# Patient Record
Sex: FEMALE | Race: WHITE | Employment: FULL TIME
[De-identification: names, ages, dates, MRNs, and addresses within clinical notes are randomized per-mention and may not be internally consistent; named-entity substitution may affect disease eponyms.]

---

## 2020-07-21 ENCOUNTER — HOSPITAL ENCOUNTER (OUTPATIENT)
Dept: ULTRASOUND IMAGING | Facility: CLINIC | Age: 35
Discharge: HOME | End: 2020-07-23
Payer: COMMERCIAL

## 2020-07-21 ENCOUNTER — HOSPITAL ENCOUNTER (OUTPATIENT)
Age: 35
Setting detail: SPECIMEN
Discharge: HOME | End: 2020-07-21
Payer: COMMERCIAL

## 2020-07-21 ENCOUNTER — OFFICE VISIT (OUTPATIENT)
Dept: OBGYN CLINIC | Age: 35
End: 2020-07-21
Payer: COMMERCIAL

## 2020-07-21 VITALS
DIASTOLIC BLOOD PRESSURE: 72 MMHG | SYSTOLIC BLOOD PRESSURE: 120 MMHG | WEIGHT: 163 LBS | HEIGHT: 63 IN | BODY MASS INDEX: 28.88 KG/M2

## 2020-07-21 PROCEDURE — 76856 US EXAM PELVIC COMPLETE: CPT

## 2020-07-21 PROCEDURE — 76830 TRANSVAGINAL US NON-OB: CPT

## 2020-07-21 PROCEDURE — 99385 PREV VISIT NEW AGE 18-39: CPT | Performed by: OBSTETRICS & GYNECOLOGY

## 2020-07-21 RX ORDER — IBUPROFEN 200 MG
200 TABLET ORAL EVERY 6 HOURS PRN
Status: ON HOLD | COMMUNITY
End: 2020-11-03 | Stop reason: HOSPADM

## 2020-07-21 NOTE — PROGRESS NOTES
DATE OF VISIT:  20        History and Physical    Rae Serrano    :  1985  CHIEF COMPLAINT:    Chief Complaint   Patient presents with   Aster Retana New Doctor     New patient Here for annual  Last pap 17nl   Has mirena Iud it was placed                     HPI :   Lara Acosta is a 30 y.o. femaleGRAVIDA 2 PARA     Lara Acosta comes to the office today as a new visit self-referral.  She is not's been seen by a GYN since . Her former GYN was Dr. Ankit Sebastian who apparently inserted a Mirena in . In  her IUD could not be found and a hysteroscopy was performed and no IUD was found inside the uterus. She apparently has been having no pelvic pain but does have chronic back pain and is followed by a chiropractor. She relatively recently had an abdominal x-ray which did show an IUD present. Review of the film is strongly suggestive that the IUD has migrated out and may be attached to cecum versus small bowel. She is having regular monthly menstrual cycles lasting 3 days with no significant discomfort. She denies painful intercourse, constipation/diarrhea or blood noted in her stool. She denies any UTI symptoms or involuntary loss of urine.   She is otherwise without any significant complaints today.  _____________________________________________________________________  Past Medical History:   Diagnosis Date    Abnormal Pap smear of cervix                                                                    Past Surgical History:   Procedure Laterality Date    LAPAROSCOPY      looked for iud    OTHER SURGICAL HISTORY  2013    cone biopsy      Family History   Problem Relation Age of Onset    Diabetes Maternal Grandfather     Hypertension Maternal Grandfather     Hypertension Father     Hypertension Mother      Social History     Tobacco Use   Smoking Status Current Every Day Smoker   Smokeless Tobacco Never Used     Social History     Substance and Sexual deficits. Skin:  A brief inspection of the skin revealed no rashes or lesions. Neck:  The neck was supple. There is no tracheal deviation, thyromegaly or supraclavicular adenopathy appreciated. Breast:   The patients breasts were symmetrical.  Breasts are nontender and there  were no masses, discharge or pathologic skin changes. There is no supraclavicular or axillary adenopathy bilaterally. Respiratory: There was unlabored respiratory effort. Lungs clear to ascultation without wheezes, rales or rhonchi in all fields bilaterally. Cardiovascular:  Normal sinus rhythm with a regular rate and without murmur, rubs or gallops. Abdomen: The abdomen was soft and non-tender with no guarding, rebound, CVAT or rigidity. No hernias were appreciated. Bowel sounds were normally active. Pelvic exam:  No vulvar, vaginal or cervical lesions are noted. Normal vaginal discharge present, no significant cystocele, rectocele or enterocele noted. Uterus nongravid and without CMT and adnexa nontender and without abnormal masses bilaterally. Extremities:  FROM and nontender without clubbing cyanosis or edema. ASSESSMENT:         ICD-10-CM    1. Encounter for gynecological examination without abnormal finding  Z01.419 PAP SMEAR   2. IUD migration, initial encounter ([facility].)  T83.89XA US Non OB Transvaginal     US Pelvis Complete                   PLAN:  Discussed x-ray findings with patient and her mother who was present for the entire visit. From the x-ray it appears that the IUD is out of the uterus and in the pelvis but will get pelvic ultrasound. Discussed that with the time the IUD has been out of the uterus and the likelihood of scarring to bowel. Also because she has been asymptomatic if IUD retrieval was done the possibility of bowel injury must be considered. She will be contacted with the ultrasound results and offered a general surgery consultation if she desires.     Return to the office in 1 year or when necessary  Patient was seen with total face to face time of 20 minutes. Salome Roberts M.D., Mph  [facility]  [place]

## 2020-07-21 NOTE — PATIENT INSTRUCTIONS
We will contact you with the results of today's Pap smear. We will also order a pelvic ultrasound, contact you with those results and recommendation for follow-up. Return to the office in 1 year or as needed. Have a safe summer and healthy year!

## 2020-07-23 LAB
HPV SAMPLE: NORMAL
HPV, GENOTYPE 16: NOT DETECTED
HPV, GENOTYPE 18: NOT DETECTED
HPV, HIGH RISK OTHER: NOT DETECTED
HPV, INTERPRETATION: NORMAL
SPECIMEN DESCRIPTION: NORMAL

## 2020-07-24 LAB — CYTOLOGY REPORT: NORMAL

## 2020-08-27 PROBLEM — F41.9 ANXIETY AND DEPRESSION: Status: ACTIVE | Noted: 2020-08-07

## 2020-08-27 PROBLEM — F32.A ANXIETY AND DEPRESSION: Status: ACTIVE | Noted: 2020-08-07

## 2020-08-27 PROBLEM — F31.9 BIPOLAR DISORDER (HCC): Status: ACTIVE | Noted: 2020-08-25

## 2020-08-27 PROBLEM — J45.30 MILD PERSISTENT ASTHMA WITHOUT COMPLICATION: Status: ACTIVE | Noted: 2020-08-07

## 2020-08-27 PROBLEM — Z72.0 TOBACCO ABUSE: Status: ACTIVE | Noted: 2020-08-07

## 2020-08-28 RX ORDER — ESCITALOPRAM OXALATE 20 MG/1
20 TABLET ORAL DAILY
Status: ON HOLD | COMMUNITY
End: 2020-11-03

## 2020-08-28 RX ORDER — ALBUTEROL SULFATE 90 UG/1
2 AEROSOL, METERED RESPIRATORY (INHALATION) EVERY 6 HOURS PRN
COMMUNITY

## 2020-08-28 NOTE — PROGRESS NOTES
Preoperative Instructions:    Stop eating solid foods at midnight the night prior to your surgery. Stop drinking clear liquids at midnight the night prior to your surgery. Arrive at the surgery center (3rd entrance) on _[phone]______________ by __1230 wearing a mask._____________. Please stop any blood thinning medications as directed by your surgeon or prescribing physician. Failure to stop certain medications may interfere with your scheduled surgery. These may include: Aspirin, Coumadin, Plavix, NSAIDS (Motrin, Aleve, Advil, Mobic, Celebrex), Eliquis, Pradaxa, Xarelto, Fish oil, and herbal supplements. Hold Ibuprofen    You may continue the rest of your medications through the night before surgery unless instructed otherwise. Day of surgery please take only the following medication(s) with a small sip of water: Lexapro      Please use and bring inhalers the day of surgery. Symbicort. (Use Albuterol if needed)      Reminders:  -If you are going home the day of your procedure, you will need a family member or friend to stay during the procedure and drive you home after your procedure. Your  must be 25years of age or older and able to sign off on your discharge instructions.    -If you are going home the same day of your surgery, someone must remain with you for the first 24 hours after your surgery if you receive sedation or anesthesia.      -Please do not wear any jewelery  Lotions, contacts or body piercing the day of surgery

## 2020-08-28 NOTE — PROGRESS NOTES
PAT interview completed. Pre anesthesia  Instructions given, Instructed pt not to smoke the morning of surgery. Asked pt to shower with [address] AND THE MORNING OF surgery.

## 2020-08-29 ENCOUNTER — HOSPITAL ENCOUNTER (OUTPATIENT)
Dept: PREADMISSION TESTING | Age: 35
Setting detail: SPECIMEN
Discharge: HOME | End: 2020-09-02
Payer: COMMERCIAL

## 2020-08-29 PROCEDURE — U0003 INFECTIOUS AGENT DETECTION BY NUCLEIC ACID (DNA OR RNA); SEVERE ACUTE RESPIRATORY SYNDROME CORONAVIRUS 2 (SARS-COV-2) (CORONAVIRUS DISEASE [COVID-19]), AMPLIFIED PROBE TECHNIQUE, MAKING USE OF HIGH THROUGHPUT TECHNOLOGIES AS DESCRIBED BY CMS-2020-01-R: HCPCS

## 2020-08-31 ENCOUNTER — ANESTHESIA EVENT (OUTPATIENT)
Dept: OPERATING ROOM | Age: 35
End: 2020-08-31
Payer: COMMERCIAL

## 2020-08-31 LAB — SARS-COV-2, NAA: NOT DETECTED

## 2020-09-01 ENCOUNTER — TELEPHONE (OUTPATIENT)
Dept: PRIMARY CARE CLINIC | Age: 35
End: 2020-09-01

## 2020-09-02 ENCOUNTER — ANESTHESIA (OUTPATIENT)
Dept: OPERATING ROOM | Age: 35
End: 2020-09-02
Payer: COMMERCIAL

## 2020-09-02 ENCOUNTER — HOSPITAL ENCOUNTER (OUTPATIENT)
Age: 35
Setting detail: OUTPATIENT SURGERY
Discharge: HOME | End: 2020-09-02
Attending: SURGERY | Admitting: SURGERY
Payer: COMMERCIAL

## 2020-09-02 ENCOUNTER — APPOINTMENT (OUTPATIENT)
Dept: GENERAL RADIOLOGY | Age: 35
End: 2020-09-02
Attending: SURGERY
Payer: COMMERCIAL

## 2020-09-02 VITALS
RESPIRATION RATE: 15 BRPM | TEMPERATURE: 98.7 F | OXYGEN SATURATION: 99 % | DIASTOLIC BLOOD PRESSURE: 84 MMHG | SYSTOLIC BLOOD PRESSURE: 125 MMHG | HEIGHT: 63 IN | HEART RATE: 75 BPM | WEIGHT: 160.2 LBS | BODY MASS INDEX: 28.39 KG/M2

## 2020-09-02 VITALS
OXYGEN SATURATION: 100 % | DIASTOLIC BLOOD PRESSURE: 84 MMHG | RESPIRATION RATE: 14 BRPM | TEMPERATURE: 96.6 F | SYSTOLIC BLOOD PRESSURE: 145 MMHG

## 2020-09-02 LAB
ABO/RH: NORMAL
ANTIBODY SCREEN: NEGATIVE
ARM BAND NUMBER: NORMAL
EXPIRATION DATE: NORMAL
HCG, PREGNANCY URINE (POC): NEGATIVE

## 2020-09-02 PROCEDURE — 2580000003 HC RX 258: Performed by: ANESTHESIOLOGY

## 2020-09-02 PROCEDURE — 3600000019 HC SURGERY ROBOT ADDTL 15MIN: Performed by: SURGERY

## 2020-09-02 PROCEDURE — 7100000000 HC PACU RECOVERY - FIRST 15 MIN: Performed by: SURGERY

## 2020-09-02 PROCEDURE — 3209999900 FLUORO FOR SURGICAL PROCEDURES

## 2020-09-02 PROCEDURE — 2709999900 HC NON-CHARGEABLE SUPPLY: Performed by: SURGERY

## 2020-09-02 PROCEDURE — 6360000002 HC RX W HCPCS: Performed by: NURSE ANESTHETIST, CERTIFIED REGISTERED

## 2020-09-02 PROCEDURE — 74018 RADEX ABDOMEN 1 VIEW: CPT

## 2020-09-02 PROCEDURE — 64488 TAP BLOCK BI INJECTION: CPT | Performed by: NURSE ANESTHETIST, CERTIFIED REGISTERED

## 2020-09-02 PROCEDURE — C9290 INJ, BUPIVACAINE LIPOSOME: HCPCS | Performed by: ANESTHESIOLOGY

## 2020-09-02 PROCEDURE — 7100000001 HC PACU RECOVERY - ADDTL 15 MIN: Performed by: SURGERY

## 2020-09-02 PROCEDURE — 86850 RBC ANTIBODY SCREEN: CPT

## 2020-09-02 PROCEDURE — 2500000003 HC RX 250 WO HCPCS: Performed by: NURSE ANESTHETIST, CERTIFIED REGISTERED

## 2020-09-02 PROCEDURE — 3700000001 HC ADD 15 MINUTES (ANESTHESIA): Performed by: SURGERY

## 2020-09-02 PROCEDURE — 7100000011 HC PHASE II RECOVERY - ADDTL 15 MIN: Performed by: SURGERY

## 2020-09-02 PROCEDURE — 6360000002 HC RX W HCPCS: Performed by: SURGERY

## 2020-09-02 PROCEDURE — 3600000009 HC SURGERY ROBOT BASE: Performed by: SURGERY

## 2020-09-02 PROCEDURE — 7100000010 HC PHASE II RECOVERY - FIRST 15 MIN: Performed by: SURGERY

## 2020-09-02 PROCEDURE — 81025 URINE PREGNANCY TEST: CPT

## 2020-09-02 PROCEDURE — 6360000002 HC RX W HCPCS

## 2020-09-02 PROCEDURE — S2900 ROBOTIC SURGICAL SYSTEM: HCPCS | Performed by: SURGERY

## 2020-09-02 PROCEDURE — 6360000002 HC RX W HCPCS: Performed by: ANESTHESIOLOGY

## 2020-09-02 PROCEDURE — 3700000000 HC ANESTHESIA ATTENDED CARE: Performed by: SURGERY

## 2020-09-02 PROCEDURE — 86901 BLOOD TYPING SEROLOGIC RH(D): CPT

## 2020-09-02 PROCEDURE — 2500000003 HC RX 250 WO HCPCS: Performed by: ANESTHESIOLOGY

## 2020-09-02 PROCEDURE — 86900 BLOOD TYPING SEROLOGIC ABO: CPT

## 2020-09-02 RX ORDER — DEXAMETHASONE SODIUM PHOSPHATE 10 MG/ML
INJECTION, SOLUTION INTRAMUSCULAR; INTRAVENOUS PRN
Status: DISCONTINUED | OUTPATIENT
Start: 2020-09-02 | End: 2020-09-02 | Stop reason: SDUPTHER

## 2020-09-02 RX ORDER — ONDANSETRON 2 MG/ML
4 INJECTION INTRAMUSCULAR; INTRAVENOUS
Status: DISCONTINUED | OUTPATIENT
Start: 2020-09-02 | End: 2020-09-02 | Stop reason: HOSPADM

## 2020-09-02 RX ORDER — BUPIVACAINE HYDROCHLORIDE 7.5 MG/ML
INJECTION, SOLUTION INTRASPINAL
Status: DISCONTINUED
Start: 2020-09-02 | End: 2020-09-02 | Stop reason: HOSPADM

## 2020-09-02 RX ORDER — FENTANYL CITRATE 50 UG/ML
INJECTION, SOLUTION INTRAMUSCULAR; INTRAVENOUS
Status: COMPLETED
Start: 2020-09-02 | End: 2020-09-02

## 2020-09-02 RX ORDER — DIPHENHYDRAMINE HYDROCHLORIDE 50 MG/ML
12.5 INJECTION INTRAMUSCULAR; INTRAVENOUS
Status: DISCONTINUED | OUTPATIENT
Start: 2020-09-02 | End: 2020-09-02 | Stop reason: HOSPADM

## 2020-09-02 RX ORDER — 0.9 % SODIUM CHLORIDE 0.9 %
500 INTRAVENOUS SOLUTION INTRAVENOUS
Status: DISCONTINUED | OUTPATIENT
Start: 2020-09-02 | End: 2020-09-02 | Stop reason: HOSPADM

## 2020-09-02 RX ORDER — BUPIVACAINE HYDROCHLORIDE 2.5 MG/ML
INJECTION, SOLUTION EPIDURAL; INFILTRATION; INTRACAUDAL
Status: DISCONTINUED
Start: 2020-09-02 | End: 2020-09-02 | Stop reason: HOSPADM

## 2020-09-02 RX ORDER — OXYCODONE HYDROCHLORIDE AND ACETAMINOPHEN 5; 325 MG/1; MG/1
2 TABLET ORAL PRN
Status: DISCONTINUED | OUTPATIENT
Start: 2020-09-02 | End: 2020-09-02 | Stop reason: HOSPADM

## 2020-09-02 RX ORDER — LABETALOL 20 MG/4 ML (5 MG/ML) INTRAVENOUS SYRINGE
5 EVERY 10 MIN PRN
Status: DISCONTINUED | OUTPATIENT
Start: 2020-09-02 | End: 2020-09-02 | Stop reason: HOSPADM

## 2020-09-02 RX ORDER — SODIUM CHLORIDE 9 MG/ML
INJECTION INTRAVENOUS
Status: DISCONTINUED
Start: 2020-09-02 | End: 2020-09-02 | Stop reason: HOSPADM

## 2020-09-02 RX ORDER — NEOSTIGMINE METHYLSULFATE 5 MG/5 ML
SYRINGE (ML) INTRAVENOUS PRN
Status: DISCONTINUED | OUTPATIENT
Start: 2020-09-02 | End: 2020-09-02 | Stop reason: SDUPTHER

## 2020-09-02 RX ORDER — FENTANYL CITRATE 50 UG/ML
100 INJECTION, SOLUTION INTRAMUSCULAR; INTRAVENOUS ONCE
Status: CANCELLED | OUTPATIENT
Start: 2020-09-02

## 2020-09-02 RX ORDER — MEPERIDINE HYDROCHLORIDE 50 MG/ML
12.5 INJECTION INTRAMUSCULAR; INTRAVENOUS; SUBCUTANEOUS EVERY 5 MIN PRN
Status: DISCONTINUED | OUTPATIENT
Start: 2020-09-02 | End: 2020-09-02 | Stop reason: HOSPADM

## 2020-09-02 RX ORDER — MIDAZOLAM HYDROCHLORIDE 1 MG/ML
INJECTION INTRAMUSCULAR; INTRAVENOUS
Status: COMPLETED
Start: 2020-09-02 | End: 2020-09-02

## 2020-09-02 RX ORDER — SODIUM CHLORIDE 0.9 % (FLUSH) 0.9 %
10 SYRINGE (ML) INJECTION EVERY 12 HOURS SCHEDULED
Status: DISCONTINUED | OUTPATIENT
Start: 2020-09-02 | End: 2020-09-02 | Stop reason: HOSPADM

## 2020-09-02 RX ORDER — FENTANYL CITRATE 50 UG/ML
INJECTION, SOLUTION INTRAMUSCULAR; INTRAVENOUS PRN
Status: DISCONTINUED | OUTPATIENT
Start: 2020-09-02 | End: 2020-09-02 | Stop reason: SDUPTHER

## 2020-09-02 RX ORDER — MIDAZOLAM HYDROCHLORIDE 1 MG/ML
INJECTION INTRAMUSCULAR; INTRAVENOUS PRN
Status: DISCONTINUED | OUTPATIENT
Start: 2020-09-02 | End: 2020-09-02 | Stop reason: SDUPTHER

## 2020-09-02 RX ORDER — GLYCOPYRROLATE 1 MG/5 ML
SYRINGE (ML) INTRAVENOUS PRN
Status: DISCONTINUED | OUTPATIENT
Start: 2020-09-02 | End: 2020-09-02 | Stop reason: SDUPTHER

## 2020-09-02 RX ORDER — BUPIVACAINE HYDROCHLORIDE 2.5 MG/ML
INJECTION, SOLUTION EPIDURAL; INFILTRATION; INTRACAUDAL
Status: COMPLETED
Start: 2020-09-02 | End: 2020-09-02

## 2020-09-02 RX ORDER — OXYCODONE HYDROCHLORIDE AND ACETAMINOPHEN 5; 325 MG/1; MG/1
1 TABLET ORAL PRN
Status: DISCONTINUED | OUTPATIENT
Start: 2020-09-02 | End: 2020-09-02 | Stop reason: HOSPADM

## 2020-09-02 RX ORDER — PROMETHAZINE HYDROCHLORIDE 25 MG/ML
12.5 INJECTION, SOLUTION INTRAMUSCULAR; INTRAVENOUS
Status: DISCONTINUED | OUTPATIENT
Start: 2020-09-02 | End: 2020-09-02 | Stop reason: HOSPADM

## 2020-09-02 RX ORDER — MIDAZOLAM HYDROCHLORIDE 1 MG/ML
2 INJECTION INTRAMUSCULAR; INTRAVENOUS ONCE
Status: CANCELLED | OUTPATIENT
Start: 2020-09-02 | End: 2020-09-02

## 2020-09-02 RX ORDER — MORPHINE SULFATE 2 MG/ML
2 INJECTION, SOLUTION INTRAMUSCULAR; INTRAVENOUS EVERY 5 MIN PRN
Status: DISCONTINUED | OUTPATIENT
Start: 2020-09-02 | End: 2020-09-02 | Stop reason: HOSPADM

## 2020-09-02 RX ORDER — PROPOFOL 10 MG/ML
INJECTION, EMULSION INTRAVENOUS PRN
Status: DISCONTINUED | OUTPATIENT
Start: 2020-09-02 | End: 2020-09-02 | Stop reason: SDUPTHER

## 2020-09-02 RX ORDER — SODIUM CHLORIDE 0.9 % (FLUSH) 0.9 %
10 SYRINGE (ML) INJECTION PRN
Status: DISCONTINUED | OUTPATIENT
Start: 2020-09-02 | End: 2020-09-02 | Stop reason: HOSPADM

## 2020-09-02 RX ORDER — KETOROLAC TROMETHAMINE 30 MG/ML
INJECTION, SOLUTION INTRAMUSCULAR; INTRAVENOUS PRN
Status: DISCONTINUED | OUTPATIENT
Start: 2020-09-02 | End: 2020-09-02 | Stop reason: SDUPTHER

## 2020-09-02 RX ORDER — LIDOCAINE HYDROCHLORIDE 10 MG/ML
INJECTION, SOLUTION EPIDURAL; INFILTRATION; INTRACAUDAL; PERINEURAL PRN
Status: DISCONTINUED | OUTPATIENT
Start: 2020-09-02 | End: 2020-09-02 | Stop reason: SDUPTHER

## 2020-09-02 RX ORDER — SODIUM CHLORIDE, SODIUM LACTATE, POTASSIUM CHLORIDE, CALCIUM CHLORIDE 600; 310; 30; 20 MG/100ML; MG/100ML; MG/100ML; MG/100ML
INJECTION, SOLUTION INTRAVENOUS CONTINUOUS
Status: DISCONTINUED | OUTPATIENT
Start: 2020-09-02 | End: 2020-09-02 | Stop reason: HOSPADM

## 2020-09-02 RX ORDER — LIDOCAINE HYDROCHLORIDE 10 MG/ML
1 INJECTION, SOLUTION EPIDURAL; INFILTRATION; INTRACAUDAL; PERINEURAL
Status: DISCONTINUED | OUTPATIENT
Start: 2020-09-02 | End: 2020-09-02 | Stop reason: HOSPADM

## 2020-09-02 RX ORDER — IBUPROFEN 600 MG/1
600 TABLET ORAL EVERY 6 HOURS PRN
Qty: 360 TABLET | Refills: 1 | Status: ON HOLD | OUTPATIENT
Start: 2020-09-02 | End: 2020-11-03 | Stop reason: SDUPTHER

## 2020-09-02 RX ORDER — BUPIVACAINE HYDROCHLORIDE 2.5 MG/ML
INJECTION, SOLUTION EPIDURAL; INFILTRATION; INTRACAUDAL
Status: COMPLETED | OUTPATIENT
Start: 2020-09-02 | End: 2020-09-02

## 2020-09-02 RX ORDER — ONDANSETRON 2 MG/ML
INJECTION INTRAMUSCULAR; INTRAVENOUS PRN
Status: DISCONTINUED | OUTPATIENT
Start: 2020-09-02 | End: 2020-09-02 | Stop reason: SDUPTHER

## 2020-09-02 RX ORDER — ROCURONIUM BROMIDE 10 MG/ML
INJECTION, SOLUTION INTRAVENOUS PRN
Status: DISCONTINUED | OUTPATIENT
Start: 2020-09-02 | End: 2020-09-02 | Stop reason: SDUPTHER

## 2020-09-02 RX ADMIN — DEXAMETHASONE SODIUM PHOSPHATE 10 MG: 10 INJECTION, SOLUTION INTRAMUSCULAR; INTRAVENOUS at 11:22

## 2020-09-02 RX ADMIN — ROCURONIUM BROMIDE 20 MG: 10 INJECTION INTRAVENOUS at 11:49

## 2020-09-02 RX ADMIN — SODIUM CHLORIDE, POTASSIUM CHLORIDE, SODIUM LACTATE AND CALCIUM CHLORIDE: 600; 310; 30; 20 INJECTION, SOLUTION INTRAVENOUS at 10:07

## 2020-09-02 RX ADMIN — CEFAZOLIN 2 G: 10 INJECTION, POWDER, FOR SOLUTION INTRAVENOUS at 11:30

## 2020-09-02 RX ADMIN — BUPIVACAINE 20 ML: 13.3 INJECTION, SUSPENSION, LIPOSOMAL INFILTRATION at 11:06

## 2020-09-02 RX ADMIN — MIDAZOLAM HYDROCHLORIDE 2 MG: 1 INJECTION, SOLUTION INTRAMUSCULAR; INTRAVENOUS at 11:15

## 2020-09-02 RX ADMIN — Medication 3 MG: at 12:26

## 2020-09-02 RX ADMIN — SODIUM CHLORIDE, POTASSIUM CHLORIDE, SODIUM LACTATE AND CALCIUM CHLORIDE: 600; 310; 30; 20 INJECTION, SOLUTION INTRAVENOUS at 12:15

## 2020-09-02 RX ADMIN — PROPOFOL 150 MG: 10 INJECTION, EMULSION INTRAVENOUS at 11:19

## 2020-09-02 RX ADMIN — FENTANYL CITRATE 100 MCG: 50 INJECTION, SOLUTION INTRAMUSCULAR; INTRAVENOUS at 10:57

## 2020-09-02 RX ADMIN — FENTANYL CITRATE 100 MCG: 50 INJECTION INTRAMUSCULAR; INTRAVENOUS at 11:19

## 2020-09-02 RX ADMIN — ROCURONIUM BROMIDE 50 MG: 10 INJECTION INTRAVENOUS at 11:19

## 2020-09-02 RX ADMIN — KETOROLAC TROMETHAMINE 30 MG: 30 INJECTION, SOLUTION INTRAMUSCULAR; INTRAVENOUS at 12:24

## 2020-09-02 RX ADMIN — BUPIVACAINE HYDROCHLORIDE 40 ML: 2.5 INJECTION, SOLUTION EPIDURAL; INFILTRATION; INTRACAUDAL; PERINEURAL at 11:06

## 2020-09-02 RX ADMIN — MIDAZOLAM HYDROCHLORIDE 2 MG: 1 INJECTION, SOLUTION INTRAMUSCULAR; INTRAVENOUS at 10:56

## 2020-09-02 RX ADMIN — LIDOCAINE HYDROCHLORIDE 50 MG: 10 INJECTION, SOLUTION EPIDURAL; INFILTRATION; INTRACAUDAL; PERINEURAL at 11:19

## 2020-09-02 RX ADMIN — ONDANSETRON 4 MG: 2 INJECTION, SOLUTION INTRAMUSCULAR; INTRAVENOUS at 12:24

## 2020-09-02 RX ADMIN — Medication 0.4 MG: at 12:26

## 2020-09-02 ASSESSMENT — PULMONARY FUNCTION TESTS
PIF_VALUE: 14
PIF_VALUE: 0
PIF_VALUE: 13
PIF_VALUE: 17
PIF_VALUE: 22
PIF_VALUE: 14
PIF_VALUE: 1
PIF_VALUE: 20
PIF_VALUE: 14
PIF_VALUE: 13
PIF_VALUE: 0
PIF_VALUE: 3
PIF_VALUE: 13
PIF_VALUE: 22
PIF_VALUE: 14
PIF_VALUE: 16
PIF_VALUE: 13
PIF_VALUE: 22
PIF_VALUE: 13
PIF_VALUE: 13
PIF_VALUE: 3
PIF_VALUE: 14
PIF_VALUE: 14
PIF_VALUE: 20
PIF_VALUE: 22
PIF_VALUE: 23
PIF_VALUE: 14
PIF_VALUE: 14
PIF_VALUE: 17
PIF_VALUE: 22
PIF_VALUE: 17
PIF_VALUE: 2
PIF_VALUE: 17
PIF_VALUE: 20
PIF_VALUE: 1
PIF_VALUE: 20
PIF_VALUE: 14
PIF_VALUE: 16
PIF_VALUE: 19
PIF_VALUE: 13
PIF_VALUE: 22
PIF_VALUE: 19
PIF_VALUE: 13
PIF_VALUE: 19
PIF_VALUE: 20
PIF_VALUE: 13
PIF_VALUE: 22
PIF_VALUE: 20
PIF_VALUE: 23
PIF_VALUE: 20
PIF_VALUE: 19
PIF_VALUE: 22
PIF_VALUE: 19
PIF_VALUE: 16
PIF_VALUE: 21
PIF_VALUE: 16
PIF_VALUE: 23
PIF_VALUE: 2
PIF_VALUE: 22
PIF_VALUE: 13
PIF_VALUE: 22
PIF_VALUE: 13
PIF_VALUE: 22
PIF_VALUE: 13
PIF_VALUE: 14
PIF_VALUE: 23
PIF_VALUE: 16
PIF_VALUE: 20
PIF_VALUE: 19
PIF_VALUE: 2
PIF_VALUE: 14
PIF_VALUE: 20
PIF_VALUE: 16

## 2020-09-02 ASSESSMENT — PAIN SCALES - GENERAL
PAINLEVEL_OUTOF10: 0

## 2020-09-02 ASSESSMENT — PAIN - FUNCTIONAL ASSESSMENT: PAIN_FUNCTIONAL_ASSESSMENT: 0-10

## 2020-09-02 ASSESSMENT — LIFESTYLE VARIABLES: SMOKING_STATUS: 1

## 2020-09-02 NOTE — BRIEF OP NOTE
Brief Postoperative Note      Patient: Jennie Saunders  YOB: 1985  MRN: [id number]    Date of Procedure: 9/2/2020    Pre-Op Diagnosis: FB ABDOMEN    Post-Op Diagnosis: Negative flexible sigmoidoscopy and diagnostic laparoscopy. IUD appears in uterine wall. Procedure(s):  LAPAROSCOPY EXPLORATORY DIAGNOSITC  SIGMOIDOSCOPY DIAGNOSTIC FLEXIBLE    Surgeon(s):  Awais Schmidt IV, DO    Assistant:  * No surgical staff found *    Anesthesia: General    Estimated Blood Loss (mL): 5cc    WC: I    Complications: None    Specimens:   * No specimens in log *    Implants:  * No implants in log *      Drains:   [REMOVED] Urethral Catheter Straight-tip 16 fr (Removed)       Findings: SB ran from LT to TI, colon ran from rectum to cecum no IUD seen. Intraop floroscopy appeared IUD in pelvis. No inflammatory changes to anterior wall rectum or sigmoid colon. Uterine posterior wall with small hard nodule/inflammatory changes. Serous fluid in pelvis. Flex sig preformed no mucosal abnormalities to sigmoid or rectum.      Electronically signed by Wes Nails DO on 9/2/2020 at 12:38 PM

## 2020-09-02 NOTE — OP NOTE
Operative Note    Patient: Reji Prieto  YOB: 1985  MRN: [id number]     Date of Procedure: 9/2/2020     Pre-Op Diagnosis: FB ABDOMEN     Post-Op Diagnosis: Negative flexible sigmoidoscopy and diagnostic laparoscopy. IUD appears in uterine wall. Procedure(s):  LAPAROSCOPY EXPLORATORY DIAGNOSITC  SIGMOIDOSCOPY DIAGNOSTIC FLEXIBLE     Surgeon(s):  Awais Schmidt IV, DO     Assistant:  * No surgical staff found *     Anesthesia: General     Estimated Blood Loss (mL): 5cc     WC: I     Complications: None     Specimens:   * No specimens in log *     Implants:  * No implants in log *      Drains:   [REMOVED] Urethral Catheter Straight-tip 16 fr (Removed)         Findings: SB ran from LT to TI, colon ran from rectum to cecum no IUD seen. Intraop floroscopy appeared IUD in pelvis. No inflammatory changes to anterior wall rectum or sigmoid colon. Uterine posterior wall with small hard nodule/inflammatory changes. Serous fluid in pelvis. Flex sig preformed no mucosal abnormalities to sigmoid or rectum. Marivel Wisdom is a 4year-old female who presents for robotic laparoscopic assisted possible colon resection. She has a history of a intrauterine device that had possibly migrated out of her uterus with concern of being attached to the cecum. The risk, benefits and alternatives of the procedure were discussed with her all questions were answered and informed consent was signed. Patient was taken back to the operating room transfer the operating room table in the supine position. She underwent general anesthesia per anesthesia guidelines. A Lee catheter was placed. A timeout was called different correct patient composition and procedure to be performed and verified by all present. Antibiotics were ministered according to SCIP protocol. Her abdomen was then prepped and draped in the usual sterile fashion.     A 8 millimeter incision was made at Ayoub's point a varies needle used to enter the abdomen. A positive saline drop test was then performed confirming entry into the abdomen and CO2 insufflation connected to create pneumoperitoneum to 15 mmHg which the patient tolerated well. A 5 mm Optiview trocar was then placed under direct visualization in the Ayoub's point incision. Laparoscope was then introduced and the area underneath the trocar inspected no obvious injury to underlying structures. A 8 mm trocar was then placed left anterior axillary line at the level of the umbilicus. A second 8 mm trocar was then placed 10 cm inferiorly. The laparoscope was then placed through the inferior trocar and the 5 mm trocar exchanged for an 8 mm robotic trocar. The patient was then placed in the Trendelenburg position. Laparoscope was placed in the middle trocar and using 2 atraumatic graspers the abdomen was inspected. The omentum was then elevated cephalad exposing the pelvis. The uterus was examined anterior surface was noted to be intact the posterior surface there is an area of inflammation along the posterior superior aspect of the uterus no obvious foreign body was noted sticking through the wall of the uterus. The rectum was then inspected there was no inflammatory changes noted to the rectum or evidence of IUD within the pelvis. There was serous fluid in the pelvis that was aspirated. We then ran the small bowel starting at the terminal ileum running cephalad towards the ligament of Treitz small bowel completely inspected along with the mesentery and there is no evidence of injury or foreign body. The colon was then inspected from the cecum all the way to the rectum no obvious evidence of inflammatory changes or foreign body. Due to this we elected to bring in a C arm and perform fluoroscopy intraoperatively. The IUD foreign body was noted within the pelvis.   Due to this we elected to abort the laparoscopic portion of the case and do a flexible sigmoidoscopy to ensure that the

## 2020-09-02 NOTE — ANESTHESIA PROCEDURE NOTES
BILATERAL FOUR POINT TAP BLOCK    Patient location during procedure: pre-op  Start time: 9/2/2020 11:01 AM  End time: 9/2/2020 11:06 AM  Staffing  Anesthesiologist: Mika Swan MD  Performed: anesthesiologist   Preanesthetic Checklist  Completed: patient identified, site marked, surgical consent, pre-op evaluation, timeout performed, IV checked, risks and benefits discussed, monitors and equipment checked, anesthesia consent given, oxygen available and patient being monitored  Peripheral Block  Patient position: supine  Prep: ChloraPrep  Patient monitoring: cardiac monitor, continuous pulse ox and frequent blood pressure checks  Block type: TAP  Laterality: bilateral  Injection technique: single-shot  Procedures: ultrasound guided  Provider prep: mask and sterile gloves  Needle  Needle type: combined needle/nerve stimulator   Needle gauge: 20 G  Needle length: 4 IN.   Needle localization: anatomical landmarks and ultrasound guidance  Assessment  Injection assessment: negative aspiration for heme, no paresthesia on injection and local visualized surrounding nerve on ultrasound  Paresthesia pain: none  Slow fractionated injection: yes  Hemodynamics: stable  Medications Administered  Bupivacaine liposome (EXPAREL) injection 1.3%, 20 mL  bupivacaine (MARCAINE) PF injection 0.25%, 40 mL  Reason for block: post-op pain management

## 2020-09-02 NOTE — PROGRESS NOTES
CLINICAL PHARMACY NOTE: MEDS TO [address] Select Patient?: No  Total # of Prescriptions Filled: 1   The following medications were delivered to the patient:  · ibuprofen  Total # of Interventions Completed: 0  Time Spent (min): 0    Additional Documentation:

## 2020-09-02 NOTE — ANESTHESIA PRE PROCEDURE
Department of Anesthesiology  Preprocedure Note       Name:  Norma Harvey   Age:  30 y.o.  :  1985                                          MRN:  [id number]         Date:  2020      Surgeon: Vera Purdy):  Jerome Dawkins IV, DO    Procedure: Procedure(s):  BOWEL RESECTION HEMICOLECTOMY LAPAROSCOPIC ROBOTIC  LAPAROSCOPY EXPLORATORY DIAGNOSITC REMOVAL OF FOREIGN BODY  LAPAROTOMY EXPLORATORY POSSIBLE  BOWEL RESECTION COLOSTOMY POSSIBLE OSTOMY    Medications prior to admission:   Prior to Admission medications    Medication Sig Start Date End Date Taking? Authorizing Provider   escitalopram (LEXAPRO) 20 MG tablet Take 20 mg by mouth daily   Yes Historical Provider, MD   Budesonide-Formoterol Fumarate (SYMBICORT IN) Inhale into the lungs daily   Yes Historical Provider, MD   ibuprofen (ADVIL;MOTRIN) 200 MG tablet Take 200 mg by mouth every 6 hours as needed for Pain   Yes Historical Provider, MD   albuterol sulfate HFA (VENTOLIN HFA) 108 (90 Base) MCG/ACT inhaler Inhale 2 puffs into the lungs every 6 hours as needed for Wheezing    Historical Provider, MD       Current medications:    Current Facility-Administered Medications   Medication Dose Route Frequency Provider Last Rate Last Dose    ceFAZolin (ANCEF) 2 g in dextrose 5 % 50 mL IVPB  2 g Intravenous Once [employer] IV, DO        lactated ringers infusion   Intravenous Continuous Zaki Farah  mL/hr at 20 1007      sodium chloride flush 0.9 % injection 10 mL  10 mL Intravenous 2 times per day Zaki Farah MD        sodium chloride flush 0.9 % injection 10 mL  10 mL Intravenous PRN Zaki Farah MD        lidocaine PF 1 % injection 1 mL  1 mL Intradermal Once PRN Zaki Farah MD        ceFAZolin (ANCEF) IVPB                Allergies:     Allergies   Allergen Reactions    Prozac [Fluoxetine]      Made her crazy        Problem List:    Patient Active Problem List   Diagnosis Code    Anxiety and depression F41.9, F32.9    Bipolar disorder (HCC) F31.9    Mild persistent asthma without complication U26.16    Tobacco abuse Z72.0       Past Medical History:        Diagnosis Date    Abnormal Pap smear of cervix     Anxiety     Asthma     Depression        Past Surgical History:        Procedure Laterality Date    LAPAROSCOPY  2012    looked for iud- unable to obtain    OTHER SURGICAL HISTORY  11/01/2013    cone biopsy        Social History:    Social History     Tobacco Use    Smoking status: Current Every Day Smoker     Packs/day: 1.00     Years: 15.00     Pack years: 15.00     Types: Cigarettes    Smokeless tobacco: Never Used   Substance Use Topics    Alcohol use: Yes     Comment: rarely                                Ready to quit: No  Counseling given: Not Answered      Vital Signs (Current):   Vitals:    09/02/20 0939 09/02/20 0946   BP: 127/84    Pulse: 93    Resp: 20    Temp: 98.4 °F (36.9 °C)    TempSrc: Temporal    SpO2: 96%    Weight:  160 lb 3.2 oz (72.7 kg)   Height:  5' 3\" (1.6 m)                                              BP Readings from Last 3 Encounters:   09/02/20 127/84   07/21/20 120/72       NPO Status: Time of last liquid consumption: 0630                        Time of last solid consumption: 1700                        Date of last liquid consumption: 09/02/20                        Date of last solid food consumption: 08/31/20    BMI:   Wt Readings from Last 3 Encounters:   09/02/20 160 lb 3.2 oz (72.7 kg)   08/27/20 163 lb (73.9 kg)   07/21/20 163 lb (73.9 kg)     Body mass index is 28.38 kg/m². CBC: No results found for: WBC, RBC, HGB, HCT, MCV, RDW, PLT    CMP: No results found for: NA, K, CL, CO2, BUN, CREATININE, GFRAA, AGRATIO, LABGLOM, GLUCOSE, PROT, CALCIUM, BILITOT, ALKPHOS, AST, ALT    POC Tests: No results for input(s): POCGLU, POCNA, POCK, POCCL, POCBUN, POCHEMO, POCHCT in the last 72 hours.     Coags: No results found for: PROTIME, INR, APTT    HCG (If Applicable):   Lab Results   Component Value Date    HCG NEGATIVE 09/02/2020        ABGs: No results found for: PHART, PO2ART, NPP4YXW, ALZ7CGD, BEART, S9KZEIZW     Type & Screen (If Applicable):  No results found for: LABABO, LABRH    Drug/Infectious Status (If Applicable):  No results found for: HIV, HEPCAB    COVID-19 Screening (If Applicable):   Lab Results   Component Value Date    COVID19 Not Detected 08/29/2020         Anesthesia Evaluation  Patient summary reviewed and Nursing notes reviewed  Airway: Mallampati: II  TM distance: >3 FB   Neck ROM: full  Mouth opening: > = 3 FB Dental: normal exam         Pulmonary:normal exam    (+) COPD:  asthma: current smoker                          ROS comment: -SMOKES 1 PPD FOR 18 YEARS   Cardiovascular:Negative CV ROS                      Neuro/Psych:   Negative Neuro/Psych ROS              GI/Hepatic/Renal: Neg GI/Hepatic/Renal ROS            Endo/Other: Negative Endo/Other ROS                     ROS comment: -NPO AFTER MIDNIGHT  -ALLERGIES - PROZAC Abdominal:           Vascular: negative vascular ROS. Anesthesia Plan      general and regional     ASA 2     (GETA, FOUR POINT TAP BLOCK)  Induction: intravenous. MIPS: Postoperative opioids intended and Prophylactic antiemetics administered. Anesthetic plan and risks discussed with patient. Plan discussed with CRNA.     Attending anesthesiologist reviewed and agrees with Moon Childress MD   9/2/2020

## 2020-09-21 ENCOUNTER — OFFICE VISIT (OUTPATIENT)
Dept: OBGYN CLINIC | Age: 35
End: 2020-09-21
Payer: COMMERCIAL

## 2020-09-21 VITALS — SYSTOLIC BLOOD PRESSURE: 110 MMHG | WEIGHT: 167 LBS | DIASTOLIC BLOOD PRESSURE: 62 MMHG | BODY MASS INDEX: 29.58 KG/M2

## 2020-09-21 PROCEDURE — 99204 OFFICE O/P NEW MOD 45 MIN: CPT | Performed by: OBSTETRICS & GYNECOLOGY

## 2020-09-21 NOTE — LETTER
[facility] [address]  Phone: [phone]  Fax: [phone], [address] [facility],         September 21, 2020     Patient: Crystal Caruso   YOB: 1985   Date of Visit: 9/21/2020       To Whom it May Concern:    Jessi Canchola was seen in my clinic on 9/21/2020. She may return to work on 09/22/2020. If you have any questions or concerns, please don't hesitate to call.     Sincerely,         Rachel Gamez, DO

## 2020-09-26 ENCOUNTER — HOSPITAL ENCOUNTER (OUTPATIENT)
Dept: CT IMAGING | Facility: CLINIC | Age: 35
Discharge: HOME | End: 2020-09-28
Payer: COMMERCIAL

## 2020-09-26 PROCEDURE — 74177 CT ABD & PELVIS W/CONTRAST: CPT

## 2020-09-26 PROCEDURE — 6360000004 HC RX CONTRAST MEDICATION: Performed by: OBSTETRICS & GYNECOLOGY

## 2020-09-26 PROCEDURE — 2580000003 HC RX 258: Performed by: OBSTETRICS & GYNECOLOGY

## 2020-09-26 RX ORDER — 0.9 % SODIUM CHLORIDE 0.9 %
70 INTRAVENOUS SOLUTION INTRAVENOUS ONCE
Status: COMPLETED | OUTPATIENT
Start: 2020-09-26 | End: 2020-09-26

## 2020-09-26 RX ORDER — SODIUM CHLORIDE 0.9 % (FLUSH) 0.9 %
10 SYRINGE (ML) INJECTION PRN
Status: DISCONTINUED | OUTPATIENT
Start: 2020-09-26 | End: 2020-09-29 | Stop reason: HOSPADM

## 2020-09-26 RX ADMIN — IOHEXOL 50 ML: 240 INJECTION, SOLUTION INTRATHECAL; INTRAVASCULAR; INTRAVENOUS; ORAL at 10:10

## 2020-09-26 RX ADMIN — SODIUM CHLORIDE 70 ML: 9 INJECTION, SOLUTION INTRAVENOUS at 10:13

## 2020-09-26 RX ADMIN — SODIUM CHLORIDE, PRESERVATIVE FREE 10 ML: 5 INJECTION INTRAVENOUS at 10:13

## 2020-09-26 RX ADMIN — IOPAMIDOL 75 ML: 755 INJECTION, SOLUTION INTRAVENOUS at 10:11

## 2020-09-30 ENCOUNTER — TELEPHONE (OUTPATIENT)
Dept: OBGYN CLINIC | Age: 35
End: 2020-09-30

## 2020-09-30 NOTE — TELEPHONE ENCOUNTER
Informed of results- will wait to hear about setting up surgery but she is wondering if she needs to contact Dr. Jodi Telles or do we?    1cm cyst- should she be concerned?

## 2020-09-30 NOTE — TELEPHONE ENCOUNTER
----- Message from Reino Apley, DO sent at 9/30/2020  9:24 AM EDT -----  Please call patient notify that I am willing to go in and try to retrieve the IUD, however it is pretty clear from CT scan that it is NOT embedded in the uterus. So I will likely need general surgeon at least aware of case and on standby unless I do find significant bowel involvement. I know that Dr. Prieto Sender did do laparoscopy as well, but I just want to be prepared.

## 2020-10-02 NOTE — TELEPHONE ENCOUNTER
Should not be concerned about 1cm cyst.    Please contact Dr. Lorraine Kramer just to notify of the procedure if at [place].     Please contact Dr. Berny Mock to notify of procedure if at [facility]

## 2020-10-05 NOTE — TELEPHONE ENCOUNTER
Shailesh- cannot do next Tuesday     Need to have Dr. Yasir Bustamante on standby- did inform her not to worry about the 1cm cyst

## 2020-10-05 NOTE — TELEPHONE ENCOUNTER
Left message pt for pt to call the office- need to know if she wants [facility] or [facility] for surgery- so I know what dr to call to have on stand by also so I can submit for the rrbs request- can look at dates but will not schedule until I have the request approved.

## 2020-10-07 PROBLEM — Z91.89 OTHER SPECIFIED PERSONAL RISK FACTORS, NOT ELSEWHERE CLASSIFIED: Status: ACTIVE | Noted: 2020-10-07

## 2020-10-07 PROBLEM — Z40.02 ENCOUNTER FOR PROPHYLACTIC SURGERY FOR RISK FACTOR RELATED TO MALIGNANT NEOPLASM OF OVARY: Status: ACTIVE | Noted: 2020-10-07

## 2020-10-20 ENCOUNTER — HOSPITAL ENCOUNTER (OUTPATIENT)
Age: 35
Discharge: HOME | End: 2020-10-20
Payer: COMMERCIAL

## 2020-10-20 ENCOUNTER — HOSPITAL ENCOUNTER (OUTPATIENT)
Dept: PREADMISSION TESTING | Age: 35
Discharge: HOME | End: 2020-10-24
Payer: COMMERCIAL

## 2020-10-20 VITALS
RESPIRATION RATE: 16 BRPM | OXYGEN SATURATION: 97 % | SYSTOLIC BLOOD PRESSURE: 111 MMHG | BODY MASS INDEX: 28.71 KG/M2 | TEMPERATURE: 98.3 F | WEIGHT: 168.2 LBS | HEART RATE: 71 BPM | HEIGHT: 64 IN | DIASTOLIC BLOOD PRESSURE: 76 MMHG

## 2020-10-20 LAB
-: ABNORMAL
ALBUMIN SERPL-MCNC: 4.5 G/DL (ref 3.5–5.2)
ALBUMIN/GLOBULIN RATIO: 2 (ref 1–2.5)
ALP BLD-CCNC: 74 U/L (ref 35–104)
ALT SERPL-CCNC: 17 U/L (ref 5–33)
AMORPHOUS: ABNORMAL
ANION GAP SERPL CALCULATED.3IONS-SCNC: 9 MMOL/L (ref 9–17)
AST SERPL-CCNC: 17 U/L
BACTERIA: ABNORMAL
BILIRUB SERPL-MCNC: 0.31 MG/DL (ref 0.3–1.2)
BILIRUBIN URINE: NEGATIVE
BUN BLDV-MCNC: 6 MG/DL (ref 6–20)
BUN/CREAT BLD: NORMAL (ref 9–20)
CALCIUM SERPL-MCNC: 9.2 MG/DL (ref 8.6–10.4)
CASTS UA: ABNORMAL /LPF (ref 0–8)
CHLORIDE BLD-SCNC: 103 MMOL/L (ref 98–107)
CO2: 26 MMOL/L (ref 20–31)
COLOR: YELLOW
COMMENT UA: ABNORMAL
CREAT SERPL-MCNC: 0.75 MG/DL (ref 0.5–0.9)
CRYSTALS, UA: ABNORMAL /HPF
EPITHELIAL CELLS UA: ABNORMAL /HPF (ref 0–5)
GFR AFRICAN AMERICAN: >60 ML/MIN
GFR NON-AFRICAN AMERICAN: >60 ML/MIN
GFR SERPL CREATININE-BSD FRML MDRD: NORMAL ML/MIN/{1.73_M2}
GFR SERPL CREATININE-BSD FRML MDRD: NORMAL ML/MIN/{1.73_M2}
GLUCOSE BLD-MCNC: 83 MG/DL (ref 70–99)
GLUCOSE URINE: NEGATIVE
HCG QUALITATIVE: NEGATIVE
HCT VFR BLD CALC: 43.8 % (ref 36.3–47.1)
HEMOGLOBIN: 14.2 G/DL (ref 11.9–15.1)
INR BLD: 1
KETONES, URINE: NEGATIVE
LEUKOCYTE ESTERASE, URINE: NEGATIVE
MCH RBC QN AUTO: 29 PG (ref 25.2–33.5)
MCHC RBC AUTO-ENTMCNC: 32.4 G/DL (ref 28.4–34.8)
MCV RBC AUTO: 89.4 FL (ref 82.6–102.9)
MUCUS: ABNORMAL
NITRITE, URINE: NEGATIVE
NRBC AUTOMATED: 0 PER 100 WBC
OTHER OBSERVATIONS UA: ABNORMAL
PARTIAL THROMBOPLASTIN TIME: 25 SEC (ref 21.3–31.3)
PDW BLD-RTO: 11.9 % (ref 11.8–14.4)
PH UA: 5.5 (ref 5–8)
PLATELET # BLD: 166 K/UL (ref 138–453)
PMV BLD AUTO: 11.2 FL (ref 8.1–13.5)
POTASSIUM SERPL-SCNC: 4.3 MMOL/L (ref 3.7–5.3)
PROTEIN UA: NEGATIVE
PROTHROMBIN TIME: 10 SEC (ref 9.4–12.6)
RBC # BLD: 4.9 M/UL (ref 3.95–5.11)
RBC UA: ABNORMAL /HPF (ref 0–4)
RENAL EPITHELIAL, UA: ABNORMAL /HPF
SODIUM BLD-SCNC: 138 MMOL/L (ref 135–144)
SPECIFIC GRAVITY UA: 1 (ref 1–1.03)
TOTAL PROTEIN: 6.7 G/DL (ref 6.4–8.3)
TRICHOMONAS: ABNORMAL
TSH SERPL DL<=0.05 MIU/L-ACNC: 2.39 MIU/L (ref 0.3–5)
TURBIDITY: CLEAR
URINE HGB: ABNORMAL
UROBILINOGEN, URINE: NORMAL
WBC # BLD: 6.7 K/UL (ref 3.5–11.3)
WBC UA: ABNORMAL /HPF (ref 0–5)
YEAST: ABNORMAL

## 2020-10-20 PROCEDURE — 36415 COLL VENOUS BLD VENIPUNCTURE: CPT

## 2020-10-20 PROCEDURE — 85027 COMPLETE CBC AUTOMATED: CPT

## 2020-10-20 PROCEDURE — 80053 COMPREHEN METABOLIC PANEL: CPT

## 2020-10-20 PROCEDURE — 85730 THROMBOPLASTIN TIME PARTIAL: CPT

## 2020-10-20 PROCEDURE — 85610 PROTHROMBIN TIME: CPT

## 2020-10-20 PROCEDURE — 84443 ASSAY THYROID STIM HORMONE: CPT

## 2020-10-20 PROCEDURE — 84703 CHORIONIC GONADOTROPIN ASSAY: CPT

## 2020-10-20 PROCEDURE — 93005 ELECTROCARDIOGRAM TRACING: CPT | Performed by: OBSTETRICS & GYNECOLOGY

## 2020-10-20 PROCEDURE — 81001 URINALYSIS AUTO W/SCOPE: CPT

## 2020-10-20 ASSESSMENT — PAIN SCALES - GENERAL: PAINLEVEL_OUTOF10: 0

## 2020-10-21 LAB
EKG ATRIAL RATE: 71 BPM
EKG P AXIS: 53 DEGREES
EKG P-R INTERVAL: 160 MS
EKG Q-T INTERVAL: 386 MS
EKG QRS DURATION: 78 MS
EKG QTC CALCULATION (BAZETT): 419 MS
EKG R AXIS: 42 DEGREES
EKG T AXIS: 14 DEGREES
EKG VENTRICULAR RATE: 71 BPM

## 2020-10-28 ENCOUNTER — ANESTHESIA EVENT (OUTPATIENT)
Dept: OPERATING ROOM | Age: 35
End: 2020-10-28
Payer: COMMERCIAL

## 2020-10-30 ENCOUNTER — HOSPITAL ENCOUNTER (OUTPATIENT)
Dept: PREADMISSION TESTING | Age: 35
Setting detail: SPECIMEN
Discharge: HOME | End: 2020-11-03
Payer: COMMERCIAL

## 2020-10-30 PROCEDURE — U0003 INFECTIOUS AGENT DETECTION BY NUCLEIC ACID (DNA OR RNA); SEVERE ACUTE RESPIRATORY SYNDROME CORONAVIRUS 2 (SARS-COV-2) (CORONAVIRUS DISEASE [COVID-19]), AMPLIFIED PROBE TECHNIQUE, MAKING USE OF HIGH THROUGHPUT TECHNOLOGIES AS DESCRIBED BY CMS-2020-01-R: HCPCS

## 2020-10-31 LAB — SARS-COV-2, NAA: NOT DETECTED

## 2020-11-03 ENCOUNTER — HOSPITAL ENCOUNTER (OUTPATIENT)
Age: 35
Setting detail: OUTPATIENT SURGERY
Discharge: HOME | End: 2020-11-03
Attending: OBSTETRICS & GYNECOLOGY | Admitting: OBSTETRICS & GYNECOLOGY
Payer: COMMERCIAL

## 2020-11-03 ENCOUNTER — APPOINTMENT (OUTPATIENT)
Dept: GENERAL RADIOLOGY | Age: 35
End: 2020-11-03
Attending: OBSTETRICS & GYNECOLOGY
Payer: COMMERCIAL

## 2020-11-03 ENCOUNTER — ANESTHESIA (OUTPATIENT)
Dept: OPERATING ROOM | Age: 35
End: 2020-11-03
Payer: COMMERCIAL

## 2020-11-03 VITALS
TEMPERATURE: 98.3 F | WEIGHT: 168 LBS | HEART RATE: 73 BPM | OXYGEN SATURATION: 99 % | DIASTOLIC BLOOD PRESSURE: 79 MMHG | RESPIRATION RATE: 20 BRPM | BODY MASS INDEX: 29.29 KG/M2 | SYSTOLIC BLOOD PRESSURE: 114 MMHG

## 2020-11-03 VITALS — SYSTOLIC BLOOD PRESSURE: 132 MMHG | DIASTOLIC BLOOD PRESSURE: 64 MMHG | OXYGEN SATURATION: 100 % | TEMPERATURE: 96.3 F

## 2020-11-03 PROBLEM — Z98.890 POSTOPERATIVE STATE: Status: ACTIVE | Noted: 2020-11-03

## 2020-11-03 LAB — HCG, PREGNANCY URINE (POC): NEGATIVE

## 2020-11-03 PROCEDURE — 3600000005 HC SURGERY LEVEL 5 BASE: Performed by: OBSTETRICS & GYNECOLOGY

## 2020-11-03 PROCEDURE — 58661 LAPAROSCOPY REMOVE ADNEXA: CPT | Performed by: OBSTETRICS & GYNECOLOGY

## 2020-11-03 PROCEDURE — 3700000000 HC ANESTHESIA ATTENDED CARE: Performed by: OBSTETRICS & GYNECOLOGY

## 2020-11-03 PROCEDURE — 2580000003 HC RX 258: Performed by: ANESTHESIOLOGY

## 2020-11-03 PROCEDURE — 3600000015 HC SURGERY LEVEL 5 ADDTL 15MIN: Performed by: OBSTETRICS & GYNECOLOGY

## 2020-11-03 PROCEDURE — 6360000002 HC RX W HCPCS: Performed by: ANESTHESIOLOGY

## 2020-11-03 PROCEDURE — 3700000001 HC ADD 15 MINUTES (ANESTHESIA): Performed by: OBSTETRICS & GYNECOLOGY

## 2020-11-03 PROCEDURE — 7100000000 HC PACU RECOVERY - FIRST 15 MIN: Performed by: OBSTETRICS & GYNECOLOGY

## 2020-11-03 PROCEDURE — 88300 SURGICAL PATH GROSS: CPT

## 2020-11-03 PROCEDURE — C9290 INJ, BUPIVACAINE LIPOSOME: HCPCS | Performed by: ANESTHESIOLOGY

## 2020-11-03 PROCEDURE — 7100000010 HC PHASE II RECOVERY - FIRST 15 MIN: Performed by: OBSTETRICS & GYNECOLOGY

## 2020-11-03 PROCEDURE — 7100000011 HC PHASE II RECOVERY - ADDTL 15 MIN: Performed by: OBSTETRICS & GYNECOLOGY

## 2020-11-03 PROCEDURE — 2709999900 HC NON-CHARGEABLE SUPPLY: Performed by: OBSTETRICS & GYNECOLOGY

## 2020-11-03 PROCEDURE — 64488 TAP BLOCK BI INJECTION: CPT | Performed by: ANESTHESIOLOGY

## 2020-11-03 PROCEDURE — 3209999900 FLUORO FOR SURGICAL PROCEDURES

## 2020-11-03 PROCEDURE — 6360000002 HC RX W HCPCS: Performed by: STUDENT IN AN ORGANIZED HEALTH CARE EDUCATION/TRAINING PROGRAM

## 2020-11-03 PROCEDURE — 2500000003 HC RX 250 WO HCPCS: Performed by: ANESTHESIOLOGY

## 2020-11-03 PROCEDURE — 2720000010 HC SURG SUPPLY STERILE: Performed by: OBSTETRICS & GYNECOLOGY

## 2020-11-03 PROCEDURE — 81025 URINE PREGNANCY TEST: CPT

## 2020-11-03 PROCEDURE — 88302 TISSUE EXAM BY PATHOLOGIST: CPT

## 2020-11-03 PROCEDURE — 6360000002 HC RX W HCPCS

## 2020-11-03 PROCEDURE — 7100000001 HC PACU RECOVERY - ADDTL 15 MIN: Performed by: OBSTETRICS & GYNECOLOGY

## 2020-11-03 RX ORDER — FENTANYL CITRATE 50 UG/ML
INJECTION, SOLUTION INTRAMUSCULAR; INTRAVENOUS
Status: COMPLETED
Start: 2020-11-03 | End: 2020-11-03

## 2020-11-03 RX ORDER — ONDANSETRON 2 MG/ML
INJECTION INTRAMUSCULAR; INTRAVENOUS PRN
Status: DISCONTINUED | OUTPATIENT
Start: 2020-11-03 | End: 2020-11-03 | Stop reason: SDUPTHER

## 2020-11-03 RX ORDER — LABETALOL 20 MG/4 ML (5 MG/ML) INTRAVENOUS SYRINGE
5 EVERY 10 MIN PRN
Status: DISCONTINUED | OUTPATIENT
Start: 2020-11-03 | End: 2020-11-03 | Stop reason: HOSPADM

## 2020-11-03 RX ORDER — IBUPROFEN 600 MG/1
600 TABLET ORAL EVERY 6 HOURS PRN
Qty: 30 TABLET | Refills: 1 | Status: SHIPPED | OUTPATIENT
Start: 2020-11-03

## 2020-11-03 RX ORDER — SODIUM CHLORIDE, SODIUM LACTATE, POTASSIUM CHLORIDE, CALCIUM CHLORIDE 600; 310; 30; 20 MG/100ML; MG/100ML; MG/100ML; MG/100ML
INJECTION, SOLUTION INTRAVENOUS CONTINUOUS
Status: DISCONTINUED | OUTPATIENT
Start: 2020-11-03 | End: 2020-11-03 | Stop reason: HOSPADM

## 2020-11-03 RX ORDER — BUPIVACAINE HYDROCHLORIDE 5 MG/ML
INJECTION, SOLUTION EPIDURAL; INTRACAUDAL
Status: DISCONTINUED
Start: 2020-11-03 | End: 2020-11-03 | Stop reason: WASHOUT

## 2020-11-03 RX ORDER — MIDAZOLAM HYDROCHLORIDE 1 MG/ML
2 INJECTION INTRAMUSCULAR; INTRAVENOUS ONCE
Status: DISCONTINUED | OUTPATIENT
Start: 2020-11-03 | End: 2020-11-03 | Stop reason: HOSPADM

## 2020-11-03 RX ORDER — SODIUM CHLORIDE 9 MG/ML
INJECTION, SOLUTION INTRAVENOUS CONTINUOUS
Status: DISCONTINUED | OUTPATIENT
Start: 2020-11-03 | End: 2020-11-03 | Stop reason: HOSPADM

## 2020-11-03 RX ORDER — PROMETHAZINE HYDROCHLORIDE 25 MG/ML
12.5 INJECTION, SOLUTION INTRAMUSCULAR; INTRAVENOUS
Status: DISCONTINUED | OUTPATIENT
Start: 2020-11-03 | End: 2020-11-03 | Stop reason: HOSPADM

## 2020-11-03 RX ORDER — MORPHINE SULFATE 2 MG/ML
INJECTION, SOLUTION INTRAMUSCULAR; INTRAVENOUS
Status: COMPLETED
Start: 2020-11-03 | End: 2020-11-03

## 2020-11-03 RX ORDER — DEXAMETHASONE SODIUM PHOSPHATE 10 MG/ML
INJECTION INTRAMUSCULAR; INTRAVENOUS PRN
Status: DISCONTINUED | OUTPATIENT
Start: 2020-11-03 | End: 2020-11-03 | Stop reason: SDUPTHER

## 2020-11-03 RX ORDER — MEPERIDINE HYDROCHLORIDE 50 MG/ML
12.5 INJECTION INTRAMUSCULAR; INTRAVENOUS; SUBCUTANEOUS EVERY 5 MIN PRN
Status: DISCONTINUED | OUTPATIENT
Start: 2020-11-03 | End: 2020-11-03 | Stop reason: HOSPADM

## 2020-11-03 RX ORDER — FENTANYL CITRATE 50 UG/ML
100 INJECTION, SOLUTION INTRAMUSCULAR; INTRAVENOUS ONCE
Status: COMPLETED | OUTPATIENT
Start: 2020-11-03 | End: 2020-11-03

## 2020-11-03 RX ORDER — SODIUM CHLORIDE 0.9 % (FLUSH) 0.9 %
10 SYRINGE (ML) INJECTION PRN
Status: DISCONTINUED | OUTPATIENT
Start: 2020-11-03 | End: 2020-11-03 | Stop reason: HOSPADM

## 2020-11-03 RX ORDER — ONDANSETRON 4 MG/1
4 TABLET, ORALLY DISINTEGRATING ORAL EVERY 8 HOURS PRN
Qty: 24 TABLET | Refills: 1 | Status: SHIPPED | OUTPATIENT
Start: 2020-11-03

## 2020-11-03 RX ORDER — OXYCODONE HYDROCHLORIDE AND ACETAMINOPHEN 5; 325 MG/1; MG/1
1 TABLET ORAL PRN
Status: DISCONTINUED | OUTPATIENT
Start: 2020-11-03 | End: 2020-11-03 | Stop reason: HOSPADM

## 2020-11-03 RX ORDER — MIDAZOLAM HYDROCHLORIDE 1 MG/ML
INJECTION INTRAMUSCULAR; INTRAVENOUS
Status: COMPLETED
Start: 2020-11-03 | End: 2020-11-03

## 2020-11-03 RX ORDER — SODIUM CHLORIDE 0.9 % (FLUSH) 0.9 %
10 SYRINGE (ML) INJECTION EVERY 12 HOURS SCHEDULED
Status: DISCONTINUED | OUTPATIENT
Start: 2020-11-03 | End: 2020-11-03 | Stop reason: HOSPADM

## 2020-11-03 RX ORDER — LIDOCAINE HYDROCHLORIDE 10 MG/ML
INJECTION, SOLUTION EPIDURAL; INFILTRATION; INTRACAUDAL; PERINEURAL PRN
Status: DISCONTINUED | OUTPATIENT
Start: 2020-11-03 | End: 2020-11-03 | Stop reason: SDUPTHER

## 2020-11-03 RX ORDER — BUPIVACAINE HYDROCHLORIDE 2.5 MG/ML
INJECTION, SOLUTION EPIDURAL; INFILTRATION; INTRACAUDAL
Status: COMPLETED
Start: 2020-11-03 | End: 2020-11-03

## 2020-11-03 RX ORDER — SIMETHICONE 80 MG
80 TABLET,CHEWABLE ORAL 4 TIMES DAILY PRN
Qty: 180 TABLET | Refills: 3 | Status: SHIPPED | OUTPATIENT
Start: 2020-11-03

## 2020-11-03 RX ORDER — DIPHENHYDRAMINE HYDROCHLORIDE 50 MG/ML
12.5 INJECTION INTRAMUSCULAR; INTRAVENOUS
Status: DISCONTINUED | OUTPATIENT
Start: 2020-11-03 | End: 2020-11-03 | Stop reason: HOSPADM

## 2020-11-03 RX ORDER — MORPHINE SULFATE 2 MG/ML
2 INJECTION, SOLUTION INTRAMUSCULAR; INTRAVENOUS EVERY 5 MIN PRN
Status: DISCONTINUED | OUTPATIENT
Start: 2020-11-03 | End: 2020-11-03 | Stop reason: HOSPADM

## 2020-11-03 RX ORDER — PROPOFOL 10 MG/ML
INJECTION, EMULSION INTRAVENOUS PRN
Status: DISCONTINUED | OUTPATIENT
Start: 2020-11-03 | End: 2020-11-03 | Stop reason: SDUPTHER

## 2020-11-03 RX ORDER — BUPIVACAINE HYDROCHLORIDE 2.5 MG/ML
INJECTION, SOLUTION EPIDURAL; INFILTRATION; INTRACAUDAL
Status: COMPLETED | OUTPATIENT
Start: 2020-11-03 | End: 2020-11-03

## 2020-11-03 RX ORDER — 0.9 % SODIUM CHLORIDE 0.9 %
500 INTRAVENOUS SOLUTION INTRAVENOUS
Status: DISCONTINUED | OUTPATIENT
Start: 2020-11-03 | End: 2020-11-03 | Stop reason: HOSPADM

## 2020-11-03 RX ORDER — AMOXICILLIN 250 MG
2 CAPSULE ORAL DAILY PRN
Qty: 60 TABLET | Refills: 0 | Status: SHIPPED | OUTPATIENT
Start: 2020-11-03

## 2020-11-03 RX ORDER — HYDROCODONE BITARTRATE AND ACETAMINOPHEN 5; 325 MG/1; MG/1
1 TABLET ORAL EVERY 4 HOURS PRN
Qty: 15 TABLET | Refills: 0 | Status: SHIPPED | OUTPATIENT
Start: 2020-11-03 | End: 2020-11-08

## 2020-11-03 RX ORDER — KETOROLAC TROMETHAMINE 30 MG/ML
INJECTION, SOLUTION INTRAMUSCULAR; INTRAVENOUS PRN
Status: DISCONTINUED | OUTPATIENT
Start: 2020-11-03 | End: 2020-11-03 | Stop reason: SDUPTHER

## 2020-11-03 RX ORDER — ROCURONIUM BROMIDE 10 MG/ML
INJECTION, SOLUTION INTRAVENOUS PRN
Status: DISCONTINUED | OUTPATIENT
Start: 2020-11-03 | End: 2020-11-03 | Stop reason: SDUPTHER

## 2020-11-03 RX ORDER — OXYCODONE HYDROCHLORIDE AND ACETAMINOPHEN 5; 325 MG/1; MG/1
2 TABLET ORAL PRN
Status: DISCONTINUED | OUTPATIENT
Start: 2020-11-03 | End: 2020-11-03 | Stop reason: HOSPADM

## 2020-11-03 RX ORDER — ONDANSETRON 2 MG/ML
4 INJECTION INTRAMUSCULAR; INTRAVENOUS
Status: DISCONTINUED | OUTPATIENT
Start: 2020-11-03 | End: 2020-11-03 | Stop reason: HOSPADM

## 2020-11-03 RX ADMIN — PROPOFOL 200 MG: 10 INJECTION, EMULSION INTRAVENOUS at 08:04

## 2020-11-03 RX ADMIN — ROCURONIUM BROMIDE 30 MG: 10 INJECTION INTRAVENOUS at 08:04

## 2020-11-03 RX ADMIN — ROCURONIUM BROMIDE 20 MG: 10 INJECTION INTRAVENOUS at 09:18

## 2020-11-03 RX ADMIN — FENTANYL CITRATE 50 MCG: 50 INJECTION INTRAMUSCULAR; INTRAVENOUS at 08:04

## 2020-11-03 RX ADMIN — MORPHINE SULFATE 2 MG: 2 INJECTION, SOLUTION INTRAMUSCULAR; INTRAVENOUS at 10:50

## 2020-11-03 RX ADMIN — BUPIVACAINE 20 ML: 13.3 INJECTION, SUSPENSION, LIPOSOMAL INFILTRATION at 07:39

## 2020-11-03 RX ADMIN — ROCURONIUM BROMIDE 20 MG: 10 INJECTION INTRAVENOUS at 08:46

## 2020-11-03 RX ADMIN — BUPIVACAINE HYDROCHLORIDE 60 ML: 2.5 INJECTION, SOLUTION EPIDURAL; INFILTRATION; INTRACAUDAL; PERINEURAL at 07:39

## 2020-11-03 RX ADMIN — FENTANYL CITRATE 50 MCG: 50 INJECTION INTRAMUSCULAR; INTRAVENOUS at 09:15

## 2020-11-03 RX ADMIN — LIDOCAINE HYDROCHLORIDE 50 MG: 10 INJECTION, SOLUTION EPIDURAL; INFILTRATION; INTRACAUDAL; PERINEURAL at 08:04

## 2020-11-03 RX ADMIN — FENTANYL CITRATE 50 MCG: 50 INJECTION INTRAMUSCULAR; INTRAVENOUS at 08:45

## 2020-11-03 RX ADMIN — ONDANSETRON 4 MG: 2 INJECTION, SOLUTION INTRAMUSCULAR; INTRAVENOUS at 10:24

## 2020-11-03 RX ADMIN — KETOROLAC TROMETHAMINE 30 MG: 30 INJECTION, SOLUTION INTRAMUSCULAR at 10:30

## 2020-11-03 RX ADMIN — FENTANYL CITRATE 100 MCG: 50 INJECTION, SOLUTION INTRAMUSCULAR; INTRAVENOUS at 07:36

## 2020-11-03 RX ADMIN — FENTANYL CITRATE 50 MCG: 50 INJECTION INTRAMUSCULAR; INTRAVENOUS at 09:26

## 2020-11-03 RX ADMIN — NEOSTIGMINE METHYLSULFATE 1 MG: 1 INJECTION, SOLUTION INTRAMUSCULAR; INTRAVENOUS; SUBCUTANEOUS at 10:45

## 2020-11-03 RX ADMIN — Medication 10 MG: at 08:30

## 2020-11-03 RX ADMIN — SODIUM CHLORIDE, POTASSIUM CHLORIDE, SODIUM LACTATE AND CALCIUM CHLORIDE: 600; 310; 30; 20 INJECTION, SOLUTION INTRAVENOUS at 06:15

## 2020-11-03 RX ADMIN — CEFAZOLIN SODIUM 2 G: 10 INJECTION, POWDER, FOR SOLUTION INTRAVENOUS at 08:10

## 2020-11-03 RX ADMIN — SODIUM CHLORIDE, POTASSIUM CHLORIDE, SODIUM LACTATE AND CALCIUM CHLORIDE: 600; 310; 30; 20 INJECTION, SOLUTION INTRAVENOUS at 07:58

## 2020-11-03 RX ADMIN — MIDAZOLAM HYDROCHLORIDE: 1 INJECTION, SOLUTION INTRAMUSCULAR; INTRAVENOUS at 07:31

## 2020-11-03 ASSESSMENT — PULMONARY FUNCTION TESTS
PIF_VALUE: 16
PIF_VALUE: 21
PIF_VALUE: 22
PIF_VALUE: 21
PIF_VALUE: 21
PIF_VALUE: 16
PIF_VALUE: 18
PIF_VALUE: 19
PIF_VALUE: 21
PIF_VALUE: 21
PIF_VALUE: 15
PIF_VALUE: 19
PIF_VALUE: 21
PIF_VALUE: 23
PIF_VALUE: 13
PIF_VALUE: 21
PIF_VALUE: 16
PIF_VALUE: 20
PIF_VALUE: 22
PIF_VALUE: 21
PIF_VALUE: 16
PIF_VALUE: 22
PIF_VALUE: 0
PIF_VALUE: 0
PIF_VALUE: 16
PIF_VALUE: 22
PIF_VALUE: 23
PIF_VALUE: 0
PIF_VALUE: 21
PIF_VALUE: 16
PIF_VALUE: 16
PIF_VALUE: 21
PIF_VALUE: 21
PIF_VALUE: 16
PIF_VALUE: 22
PIF_VALUE: 16
PIF_VALUE: 20
PIF_VALUE: 24
PIF_VALUE: 3
PIF_VALUE: 15
PIF_VALUE: 16
PIF_VALUE: 21
PIF_VALUE: 16
PIF_VALUE: 20
PIF_VALUE: 22
PIF_VALUE: 21
PIF_VALUE: 21
PIF_VALUE: 22
PIF_VALUE: 21
PIF_VALUE: 22
PIF_VALUE: 21
PIF_VALUE: 21
PIF_VALUE: 23
PIF_VALUE: 16
PIF_VALUE: 16
PIF_VALUE: 4
PIF_VALUE: 21
PIF_VALUE: 21
PIF_VALUE: 22
PIF_VALUE: 21
PIF_VALUE: 22
PIF_VALUE: 7
PIF_VALUE: 16
PIF_VALUE: 22
PIF_VALUE: 21
PIF_VALUE: 22
PIF_VALUE: 22
PIF_VALUE: 21
PIF_VALUE: 22
PIF_VALUE: 22
PIF_VALUE: 16
PIF_VALUE: 16
PIF_VALUE: 0
PIF_VALUE: 20
PIF_VALUE: 20
PIF_VALUE: 21
PIF_VALUE: 19
PIF_VALUE: 23
PIF_VALUE: 21
PIF_VALUE: 22
PIF_VALUE: 21
PIF_VALUE: 22
PIF_VALUE: 1
PIF_VALUE: 21
PIF_VALUE: 16
PIF_VALUE: 21
PIF_VALUE: 0
PIF_VALUE: 16
PIF_VALUE: 17
PIF_VALUE: 21
PIF_VALUE: 17
PIF_VALUE: 22
PIF_VALUE: 21
PIF_VALUE: 1
PIF_VALUE: 21
PIF_VALUE: 22
PIF_VALUE: 21
PIF_VALUE: 21
PIF_VALUE: 16
PIF_VALUE: 22
PIF_VALUE: 21
PIF_VALUE: 17
PIF_VALUE: 19
PIF_VALUE: 22
PIF_VALUE: 16
PIF_VALUE: 22
PIF_VALUE: 21
PIF_VALUE: 17
PIF_VALUE: 21
PIF_VALUE: 16
PIF_VALUE: 22
PIF_VALUE: 16
PIF_VALUE: 1
PIF_VALUE: 16
PIF_VALUE: 22
PIF_VALUE: 21
PIF_VALUE: 20
PIF_VALUE: 16
PIF_VALUE: 21
PIF_VALUE: 21
PIF_VALUE: 23
PIF_VALUE: 21
PIF_VALUE: 19
PIF_VALUE: 22
PIF_VALUE: 21
PIF_VALUE: 9
PIF_VALUE: 16
PIF_VALUE: 15
PIF_VALUE: 20
PIF_VALUE: 16
PIF_VALUE: 23
PIF_VALUE: 16
PIF_VALUE: 22
PIF_VALUE: 16
PIF_VALUE: 20
PIF_VALUE: 21
PIF_VALUE: 1
PIF_VALUE: 21
PIF_VALUE: 16
PIF_VALUE: 21
PIF_VALUE: 10
PIF_VALUE: 16
PIF_VALUE: 16
PIF_VALUE: 21
PIF_VALUE: 23
PIF_VALUE: 21
PIF_VALUE: 22

## 2020-11-03 ASSESSMENT — LIFESTYLE VARIABLES: SMOKING_STATUS: 1

## 2020-11-03 ASSESSMENT — PAIN SCALES - GENERAL
PAINLEVEL_OUTOF10: 0
PAINLEVEL_OUTOF10: 3
PAINLEVEL_OUTOF10: 0
PAINLEVEL_OUTOF10: 5

## 2020-11-03 ASSESSMENT — PAIN DESCRIPTION - PROGRESSION: CLINICAL_PROGRESSION: GRADUALLY IMPROVING

## 2020-11-03 ASSESSMENT — PAIN DESCRIPTION - PAIN TYPE: TYPE: SURGICAL PAIN

## 2020-11-03 ASSESSMENT — PAIN DESCRIPTION - LOCATION: LOCATION: ABDOMEN

## 2020-11-03 NOTE — ANESTHESIA PRE PROCEDURE
Department of Anesthesiology  Preprocedure Note       Name:  Nato Dexter   Age:  30 y.o.  :  1985                                          MRN:  [id number]         Date:  11/3/2020      Surgeon: Radha Malloy):  Radha Wu DO    Procedure: Procedure(s):  LAPAROSCOPIC ROBOTIC  XI EXCISION OF MIGRATED IUD. HYSTEROSCOPY - POSSIBLE HYSTERSCOPIC REMOVAL OF MIGRATED IUD. Medications prior to admission:   Prior to Admission medications    Medication Sig Start Date End Date Taking? Authorizing Provider   sertraline (ZOLOFT) 50 MG tablet Take 50 mg by mouth daily   Yes Historical Provider, MD   ibuprofen (ADVIL;MOTRIN) 600 MG tablet Take 1 tablet by mouth every 6 hours as needed for Pain 20  Yes Corrie Hart DO   ibuprofen (ADVIL;MOTRIN) 200 MG tablet Take 200 mg by mouth every 6 hours as needed for Pain   Yes Historical Provider, MD   Budesonide-Formoterol Fumarate (SYMBICORT IN) Inhale into the lungs daily    Historical Provider, MD   albuterol sulfate HFA (VENTOLIN HFA) 108 (90 Base) MCG/ACT inhaler Inhale 2 puffs into the lungs every 6 hours as needed for Wheezing    Historical Provider, MD       Current medications:    Current Facility-Administered Medications   Medication Dose Route Frequency Provider Last Rate Last Dose    0.9 % sodium chloride infusion   Intravenous Continuous Teto Gu MD        lactated ringers infusion   Intravenous Continuous Teto Gu  mL/hr at 20 0615      sodium chloride flush 0.9 % injection 10 mL  10 mL Intravenous 2 times per day Teto Gu MD        sodium chloride flush 0.9 % injection 10 mL  10 mL Intravenous PRN Teto Gu MD           Allergies:     Allergies   Allergen Reactions    Prozac [Fluoxetine]      Made her crazy        Problem List:    Patient Active Problem List   Diagnosis Code    Anxiety and depression F41.9, F32.9    Bipolar disorder ([facility] [address] F31.9    Mild persistent asthma without complication V01.93    Tobacco abuse Z72.0    Other specified personal risk factors, not elsewhere classified Z91.89    Encounter for prophylactic surgery for risk factor related to malignant neoplasm of ovary Z40.02       Past Medical History:        Diagnosis Date    Abnormal Pap smear of cervix     Anxiety     Asthma     Depression     IUD migration, initial encounter 07/2020       Past Surgical History:        Procedure Laterality Date    ABDOMEN SURGERY  09/02/2020     LAPAROSCOPY EXPLORATORY . IUD found in uterine wall.     COLONOSCOPY  09/02/2020    DIAGNOSITC (N/A ) colonoscopy    LAPAROSCOPY  2012    looked for iud- unable to obtain    LAPAROSCOPY N/A 9/2/2020    LAPAROSCOPY EXPLORATORY performed by Gayatri Dawkins IV, DO at [facility]  11/01/2013    cone biopsy     SIGMOIDOSCOPY  9/2/2020    SIGMOIDOSCOPY DIAGNOSTIC FLEXIBLE performed by Gayatri Dawkins IV, DO at [address] [place] [address] History:    Social History     Tobacco Use    Smoking status: Current Every Day Smoker     Packs/day: 1.00     Years: 15.00     Pack years: 15.00     Types: Cigarettes    Smokeless tobacco: Never Used   Substance Use Topics    Alcohol use: Yes     Comment: rarely                                Ready to quit: Not Answered  Counseling given: Not Answered      Vital Signs (Current):   Vitals:    11/03/20 0548   BP: 118/72   Pulse: 76   Resp: 16   Temp: 98.1 °F (36.7 °C)   SpO2: 97%   Weight: 168 lb (76.2 kg)                                              BP Readings from Last 3 Encounters:   11/03/20 118/72   10/20/20 111/76   09/21/20 110/62       NPO Status: Time of last liquid consumption: 0400(sips of water)                        Time of last solid consumption: 2000                        Date of last liquid consumption: 11/03/20                        Date of last solid food consumption: 11/02/20    BMI:   Wt Readings from Last 3 Encounters:   11/03/20 168 lb (76.2 kg)   10/20/20 168 lb 3.2 oz (76.3 kg) 09/21/20 167 lb (75.8 kg)     Body mass index is 29.29 kg/m². CBC:   Lab Results   Component Value Date    WBC 6.7 10/20/2020    RBC 4.90 10/20/2020    HGB 14.2 10/20/2020    HCT 43.8 10/20/2020    MCV 89.4 10/20/2020    RDW 11.9 10/20/2020     10/20/2020       CMP:   Lab Results   Component Value Date     10/20/2020    K 4.3 10/20/2020     10/20/2020    CO2 26 10/20/2020    BUN 6 10/20/2020    CREATININE 0.75 10/20/2020    GFRAA >60 10/20/2020    LABGLOM >60 10/20/2020    GLUCOSE 83 10/20/2020    PROT 6.7 10/20/2020    CALCIUM 9.2 10/20/2020    BILITOT 0.31 10/20/2020    ALKPHOS 74 10/20/2020    AST 17 10/20/2020    ALT 17 10/20/2020       POC Tests: No results for input(s): POCGLU, POCNA, POCK, POCCL, POCBUN, POCHEMO, POCHCT in the last 72 hours.     Coags:   Lab Results   Component Value Date    PROTIME 10.0 10/20/2020    INR 1.0 10/20/2020    APTT 25.0 10/20/2020       HCG (If Applicable):   Lab Results   Component Value Date    HCG NEGATIVE 11/03/2020        ABGs: No results found for: PHART, PO2ART, TGU2AHJ, FYT4ZMH, BEART, Y4WVWSRQ     Type & Screen (If Applicable):  No results found for: LABABO, LABRH    Drug/Infectious Status (If Applicable):  No results found for: HIV, HEPCAB    COVID-19 Screening (If Applicable):   Lab Results   Component Value Date    COVID19 Not Detected 10/30/2020         Anesthesia Evaluation  Patient summary reviewed and Nursing notes reviewed  Airway: Mallampati: I  TM distance: >3 FB   Neck ROM: full  Mouth opening: > = 3 FB Dental: normal exam         Pulmonary:normal exam    (+) COPD:  asthma: current smoker                          ROS comment: -SMOKES 1 PPD FOR 18 YEARS  -ASTHMA - WELL CONTROLLED   Cardiovascular:Negative CV ROS          ECG reviewed                     ROS comment: -EKG - SR @ 71     Neuro/Psych:   Negative Neuro/Psych ROS              GI/Hepatic/Renal: Neg GI/Hepatic/Renal ROS            Endo/Other: Negative Endo/Other ROS ROS comment: -NPO AFTER MIDNIGHT  -ALLERGIES - PROZAC Abdominal:           Vascular: negative vascular ROS. Anesthesia Plan      general and regional     ASA 2     (GETA, TAP BLOCK)  Induction: intravenous. MIPS: Postoperative opioids intended and Prophylactic antiemetics administered. Anesthetic plan and risks discussed with patient. Plan discussed with CRNA.     Attending anesthesiologist reviewed and agrees with Pre Eval content              Justo Agustin MD   11/3/2020

## 2020-11-03 NOTE — PROGRESS NOTES
CLINICAL PHARMACY NOTE: MEDS TO [address] [place] [address] Drive Select Patient?: No  Total # of Prescriptions Filled: 5   The following medications were delivered to the patient:  · Hydrocodone/acetaminophen 5/325 mg  · Simethicone 80 mg  · ibuprofen 600 mg  · Ondansetron 4 mg ODT  · Senna-docusate 8.6-50  mg  Total # of Interventions Completed: 0  Time Spent (min): 0    Additional Documentation:

## 2020-11-03 NOTE — OP NOTE
average risk for development of ovarian cancer. Advised patient that the primary benefit of such surgery is a 65% reduction in future risk of ovarian cancer. Patient advised that large scale studies have not demonstrated an increase in estimated blood loss, complications, or operating time but that bleeding, infection, and injury to nearby organs are potential complications with this additional surgery. Finally, patient has been thoroughly counseled regarding the consequence of loss of fertility following this procedure. Patient understands that this loss of fertility can not be reversed and has expressed via verbal and written consent that her wishes are to proceed with the this surgery for the purposes of ovarian cancer reduction. Procedure Details   The patient was seen in the pre-op room. The risks, benefits, complications, treatment options, and expected outcomes were discussed with the patient. The patient concurred with the proposed plan, giving informed consent. The patient was taken to the Operating Room and identified as Rae Serrano and the procedure was verified. A Time Out was held and the above information confirmed. After administration of general anesthesia, the patient was placed in the dorsolithotomy position with yellowfin stirrups and examination under general anesthesia was performed with findings as noted below. A rea catheter was inserted into the bladder. The patient was prepped and draped in the usual sterile fashion. A weighted speculum was placed into the vagina to visualize the cervix. The cervix was grasped with a single-tooth tenaculum. The cervix was dilated with hegar dilators. A acorn uterine manipulator was inserted into the cervical canal to aid in visualization during the laparoscopic portion of the case. Attention was then turned to the abdominal portion of the case. A 5 mm supraumbilical incision was made using a scalpel.  The skin was grasped on either side of the incision and manually elevated with perforating towel clamps. The Veress needle was inserted through incision. Next, a syringe  to inject normal saline into the Veress needle. The normal saline was noted to drop freely, and the Veress needle was connected to the CO2 gas which was started at its lowest setting. The abdomen was insufflated. The Veress needle was removed. A 5 mm port was then inserted under direct visualization through the port. CO2 gas was then used to create a pneumoperitoneum. The patient was then placed in trendelenberg position. Survey of the pelvis was then performed, revealing normal appearing uterus, bilateral tubes with multiple paratubal cysts bilaterally, and normal appearing bilateral ovaries; unable to grossly visualize IUD embedded within the myometrium, bowel or omentum. Two 5 mm ports were then placed, one in the RLQ and the other in the LLQ under direct visualization. Attention was then turned to the risk reducing bilateral salpingectomy portion of the case. The left fallopian tube was grasped and using the Ligasure device, the mesosalpinx was coagulated and cut in order to perform the salpingectomy. The same procedure was performed on the right and once each salpinx was , they were passed off for pathology evaluation. Attention was then returned to attempting to locate the IUD. Decision was made to bring in a C arm and perform fluoroscopy intraoperatively to identify the location of the IUD. Several images obtained while the patient was in Trendelenburg position, neutral position and reverse Trendelenburg position and IUD visualized and appeared to be moving as the patient changed positions and suspected to be located within the omentum. Patient was then positioned in steep reverse Trendelenburg position and pelvic lavage performed with attempted to dislodge IUD without success. IUD was then identified and noted to be embedded within the omentum.  IUD was grasped with laparoscopic graspers and gently removed from the omentum and then removed from the abdomen without difficulty. Omentum appeared hemostatic. A final survey of the pelvis was conducted, hemostasis was noted. All instruments were then removed. Pneumoperitoneum was evacuated. Skin was closed with 4-0 Vicryl interrupted. Incisions were clean, dried and dressed in sterile fashion. All instruments were then removed from the vagina. Hemostasis was noted at the tenaculum site. Lee catheter was removed. Dr. Jamin Cadena was present for the entire operation. Findings:  Normal appearing external genitalia, normal appearing vaginal mucosa, normal appearing cervix, normal appearing uterus, bilateral fallopian tubes with several paratubal cysts, normal appearing bilateral ovaries, IUD embedded in omentum   Total IV fluids/Blood products:  1200 ml crystalloid  Urine Output:  380 ml    Estimated blood loss:  10ml  Drains:  none  Specimens: Left and right fallopian tubes  Instrument and Sponge Count: Correct  Complications:  none  Condition:  stable, transferred to post anesthesia recovery    Анна Farris DO  Ob/Gyn Resident  11/3/2020, 10:43 AM         Date: 11/3/2020  Time: 4:28 PM    Patient Name: Demetria Shah  Patient : 1985  Room/Bed: [facility] [place] OR [place] RM/NONE  Admission Date/Time: 11/3/2020  5:34 AM  MRN #: [id number]  [facility] #: [id number]      Attending Attestation:   I was present and scrubbed for the entire procedure.     Attending Name:  Bebe Lozano DO

## 2020-11-03 NOTE — H&P
PAST MEDICAL HISTORY:   has a past medical history of Abnormal Pap smear of cervix, Anxiety, Asthma, Depression, and IUD migration, initial encounter. PAST SURGICAL HISTORY:   has a past surgical history that includes other surgical history (11/01/2013); laparoscopy (2012); Abdomen surgery (09/02/2020); Colonoscopy (09/02/2020); sigmoidoscopy (9/2/2020); and laparoscopy (N/A, 9/2/2020).     ALLERGIES:  Allergies as of 10/14/2020 - Review Complete 09/21/2020   Allergen Reaction Noted    Prozac [fluoxetine]  07/21/2020       MEDICATIONS:  Current Facility-Administered Medications   Medication Dose Route Frequency Provider Last Rate Last Dose    0.9 % sodium chloride infusion   Intravenous Continuous Teto Gu MD        lactated ringers infusion   Intravenous Continuous Teto Gu  mL/hr at 11/03/20 0615      sodium chloride flush 0.9 % injection 10 mL  10 mL Intravenous 2 times per day Teto Gu MD        sodium chloride flush 0.9 % injection 10 mL  10 mL Intravenous PRN Teto Gu MD        morphine (PF) injection 2 mg  2 mg Intravenous Q5 Min PRN Inderjit Jenkins MD        HYDROmorphone (DILAUDID) injection 0.5 mg  0.5 mg Intravenous Q5 Min PRN Inedrjit Jenkins MD        HYDROmorphone (DILAUDID) injection 0.25 mg  0.25 mg Intravenous Q5 Min PRN Inderjit Jenkins MD        HYDROmorphone (DILAUDID) injection 0.5 mg  0.5 mg Intravenous Q5 Min PRN Inderjit Jenkins MD        oxyCODONE-acetaminophen (PERCOCET) 5-325 MG per tablet 1 tablet  1 tablet Oral PRN Inderjit Jenkins MD        Or    oxyCODONE-acetaminophen (PERCOCET) 5-325 MG per tablet 2 tablet  2 tablet Oral PRN Inderjit Jenkins MD        diphenhydrAMINE (BENADRYL) injection 12.5 mg  12.5 mg Intravenous Once PRN Inderjit Jenkins MD        0.9 % sodium chloride bolus  500 mL Intravenous Once PRN Inderjit Jenkins MD        ondansetron (ZOFRAN) injection 4 mg  4 mg Intravenous Once PRN Inderjit Jenkins MD        promethazine (PHENERGAN) injection 12.5 mg  12.5 mg Intravenous Q15 Min PRN Vidya Mendoza MD        labetalol (NORMODYNE;TRANDATE) injection syringe 5 mg  5 mg Intravenous Q10 Min PRN Vidya Mendoza MD        meperidine (DEMEROL) injection 12.5 mg  12.5 mg Intravenous Q5 Min PRN Vidya Mendoza MD        fentaNYL (SUBLIMAZE) injection 100 mcg  100 mcg Intravenous Once Vidya Mendoza MD        midazolam (VERSED) injection 2 mg  2 mg Intravenous Once Vidya Mendoza MD        bupivacaine (PF) (MARCAINE) 0.5 % injection             fentaNYL (SUBLIMAZE) 100 MCG/2ML injection             midazolam (VERSED) 2 MG/2ML injection             bupivacaine liposome (EXPAREL) 1.3 % injection             bupivacaine (PF) (MARCAINE) 0.25 % injection                FAMILY HISTORY:  family history includes Diabetes in her maternal grandfather; Hypertension in her father, maternal grandfather, and mother. SOCIAL HISTORY:   reports that she has been smoking cigarettes. She has a 15.00 pack-year smoking history. She has never used smokeless tobacco. She reports current alcohol use. She reports previous drug use.     VITALS:  Vitals:    11/03/20 0548 11/03/20 0714   BP: 118/72 122/75   Pulse: 76 70   Resp: 16 16   Temp: 98.1 °F (36.7 °C)    SpO2: 97% 99%   Weight: 168 lb (76.2 kg)                                                                                                                              PHYSICAL EXAM:     Unchanged from Prior H&P  CONSTITUTIONAL:  Alert and oriented, no acute distress  HEAD: normocephalic, atraumatic  EYES: Pupils equal and reactive to light, Extraocular muscles intact, sclera non icteric  ENT: Mucus membranes moist, No otorrhea, no rhinorrhea  NECK:  supple, symmetrical, trachea midline   LUNGS:  Good air movement bilaterally, unlabored respirations, no wheezes or rhonchi  CARDIOVASCULAR: Regular rate and rhythm, no murmurs rubs or gallops  ABDOMEN: soft, non tender, non distended, no rebound or guarding, no hernias, no hepatomegaly, no splenomegly, three well healed laparoscopic incisions noted on left abdomen  MUSCULOSKELETAL:  Equal strength bilaterally, normal muscle tone  SKIN: No abscess or rash  NEUROLOGIC:  Cranial nerves 2-12 grossly intact, no focal deficits  PSYCH: affect appropriate  Pelvic Exam: deferred to OR      LAB RESULTS:  Admission on 11/03/2020   Component Date Value Ref Range Status    HCG, Pregnancy Urine (POC) 11/03/2020 NEGATIVE  NEGATIVE Final    Comment: Specimens with hCG levels near the threshold of the test (25 mIU/mL) may give a negative or   indeterminate result. In such cases, another test should be performed with a new specimen   in 48-72 hours. If early pregnancy is suspected clinically in this setting, correlation   with quantitative serum b-hCG level is suggested. TESTING PERFORMED AT  [facility] [address] [facility], [address] Outpatient Visit on 10/30/2020   Component Date Value Ref Range Status    SARS-CoV-2, DAMON 10/30/2020 Not Detected  Not Detected Final    Comment: (NOTE)  This nucleic acid amplification test was developed and its  performance characteristics determined by [facility]. Nucleic acid amplification tests include PCR and TMA. This test has  not been FDA cleared or approved. This test has been authorized by  FDA under an Emergency Use Authorization (EUA). This test is only  authorized for the duration of time the declaration that  circumstances exist justifying the authorization of the emergency use  of in vitro diagnostic tests for detection of SARS-CoV-2 virus and/or  diagnosis of COVID-19 infection under section 564(b)(1) of the Act,  21 U. S.C. 126IUZ-7(U) (1), unless the authorization is terminated or  revoked sooner.   When diagnostic testing is negative, the possibility of a false  negative result should be considered in the context of a patient's  recent exposures and the presence of clinical signs and symptoms  consistent with COVID-19. An individual without symptoms of COVID-  19 and who is not shedding SARS-CoV-2 vi                           aurelio would expect to have a  negative (not detected) result in this assay.   Performed At: [address] [id number]  [address] [id number]     Hospital Outpatient Visit on 10/20/2020   Component Date Value Ref Range Status    Ventricular Rate 10/20/2020 71  BPM Corrected    Atrial Rate 10/20/2020 71  BPM Corrected    P-R Interval 10/20/2020 160  ms Corrected    QRS Duration 10/20/2020 78  ms Corrected    Q-T Interval 10/20/2020 386  ms Corrected    QTc Calculation (Bazett) 10/20/2020 419  ms Corrected    P Axis 10/20/2020 53  degrees Corrected    R Axis 10/20/2020 42  degrees Corrected    T Axis 10/20/2020 14  degrees Corrected   Hospital Outpatient Visit on 10/20/2020   Component Date Value Ref Range Status    Color, UA 10/20/2020 YELLOW  YELLOW Final    Turbidity UA 10/20/2020 CLEAR  CLEAR Final    Glucose, Ur 10/20/2020 NEGATIVE  NEGATIVE Final    Bilirubin Urine 10/20/2020 NEGATIVE  NEGATIVE Final    Ketones, Urine 10/20/2020 NEGATIVE  NEGATIVE Final    Specific Gravity, UA 10/20/2020 1.003* 1.005 - 1.030 Final    Urine Hgb 10/20/2020 TRACE* NEGATIVE Final    pH, UA 10/20/2020 5.5  5.0 - 8.0 Final    Protein, UA 10/20/2020 NEGATIVE  NEGATIVE Final    Urobilinogen, Urine 10/20/2020 Normal  Normal Final    Nitrite, Urine 10/20/2020 NEGATIVE  NEGATIVE Final    Leukocyte Esterase, Urine 10/20/2020 NEGATIVE  NEGATIVE Final    Urinalysis Comments 10/20/2020 NOT REPORTED   Final    Glucose 10/20/2020 83  70 - 99 mg/dL Final    BUN 10/20/2020 6  6 - 20 mg/dL Final    CREATININE 10/20/2020 0.75  0.50 - 0.90 mg/dL Final    Bun/Cre Ratio 10/20/2020 NOT REPORTED  9 - 20 Final    Calcium 10/20/2020 9.2  8.6 - 10.4 mg/dL Final    Sodium 10/20/2020 clinically in this setting, correlation   with quantitative serum b-hCG level is suggested. [facility] has confirmed the use of plasma for this test. This has not been cleared   or approved by the U.S. Food and Drug Administration. The FDA has determined that such   clearance is not necessary.  PTT 10/20/2020 25.0  21.3 - 31.3 sec Final    Protime 10/20/2020 10.0  9.4 - 12.6 sec Final    INR 10/20/2020 1.0   Final    Comment:       Therapeutic Range: Moderate Anticoagulant Intensity:     INR = 2.0-3.0   High Anticoagulant Intensity:     INR = 2.5-3.5            TSH 10/20/2020 2.39  0.30 - 5.00 mIU/L Final    - 10/20/2020        Final    WBC, UA 10/20/2020 0 TO 2  0 - 5 /HPF Final    RBC, UA 10/20/2020 0 TO 2  0 - 4 /HPF Final    Reference range defined for non-centrifuged specimen.  Casts UA 10/20/2020 NOT REPORTED  0 - 8 /LPF Final    Crystals, UA 10/20/2020 NOT REPORTED  None /HPF Final    Epithelial Cells UA 10/20/2020 5 TO 10  0 - 5 /HPF Final    Renal Epithelial, UA 10/20/2020 NOT REPORTED  0 /HPF Final    Bacteria, UA 10/20/2020 FEW* None Final    Mucus, UA 10/20/2020 NOT REPORTED  None Final    Trichomonas, UA 10/20/2020 NOT REPORTED  None Final    Amorphous, UA 10/20/2020 NOT REPORTED  None Final    Other Observations UA 10/20/2020 NOT REPORTED  NOT REQ. Final    Yeast, UA 10/20/2020 NOT REPORTED  None Final       DIAGNOSTICS:  Narrative    EXAMINATION:    CT OF THE ABDOMEN AND PELVIS WITH CONTRAST 9/26/2020 9:04 am         TECHNIQUE:    CT of the abdomen and pelvis was performed with the administration of    intravenous contrast. Multiplanar reformatted images are provided for review.     Dose modulation, iterative reconstruction, and/or weight based adjustment of    the mA/kV was utilized to reduce the radiation dose to as low as reasonably    achievable.         COMPARISON:    None.         HISTORY:    ORDERING SYSTEM PROVIDED HISTORY: IUD migration, initial encounter    TECHNOLOGIST PROVIDED HISTORY:         migration of IUD    Reason for Exam: Eval migration of IUD. IUD was placed in 2007, in 2012 went    for removal and IUD was not there. Pt was told it fell out. Recently pt had    xray showing IUD in the RLQ, and laparoscopy shows the IUD in the myometrium. No trauma. No abdomen pelvis complaints otherwise. Acuity: Chronic    Type of Exam: Initial         FINDINGS:    Lower Chest: Normal heart size.  Lung bases clear.         Organs: 1 cm cyst hepatic segment 5 posterior to the gallbladder fossa. Otherwise liver, spleen, pancreas, adrenal glands kidneys and gallbladder    appear unremarkable.         GI/Bowel: No evidence of bowel obstruction or inflammation.  Normal appendix.         Pelvis: Bladder and uterus appear unremarkable.  Normal size ovaries with    dominant follicle on the left and likely small corpus luteum on the right.         IUD is malpositioned and extrauterine located in the anterior right    paramidline pelvis situated at the level between the bladder and uterus and    just adjacent to cluster of small bowel loops superiorly.         Peritoneum/Retroperitoneum: Normal caliber abdominal aorta.  No suspicious    lymphadenopathy. No ascites or free air.         Bones/Soft Tissues: L5-S1 mild disc space and severe bilateral neural    foraminal narrowing.  L4-L5 facet hypertrophy.              Impression    Malposition extrauterine IUD in the anterior right pelvis as described.         The findings were sent to the Radiology Results [address] at 10:26    am on 9/26/2020to be communicated to a licensed caregiver. DIAGNOSIS & PLAN:  1. Malpositioned IUD   - Proceed with planned procedure: Diagnostic/Operative Laparoscopy, Possible Robot Assisted Laparoscopic Excision of retained IUD; Risk Reducing Bilateral Salpingectomy; Hysteroscopy; Possible Hysterectomy    - Consent signed, on chart.    - The patient is ready for transport to the operative suite. Counseling: The patient was counseled on all options both medical and surgical, conservative as well as definitive. She has elected to proceed with the procedure as stated above. The patient was counseled on the procedure. Risks and complications were reviewed in detail. The patients orders, labs, consents have been completed. The history and physical as well as all supporting surgical documentation will be forwarded to the pre-operative holding area. The patient is aware that this procedure may not alleviate her symptoms. That there may be a necessity for a second surgery and that there may be an incomplete removal of abnormal tissue.     Francicsa Jesus DO  Ob/Gyn Resident  Pager: [phone]  [facility] [address]  11/3/2020, 7:30 AM

## 2020-11-03 NOTE — BRIEF OP NOTE
Brief Operative Note  Department of Obstetrics and Gynecology  [address]     Patient: Demetria Shah   : 1985  MRN: [id number]       Acct: [de-identified]   Date of Procedure: 11/3/20     Pre-operative Diagnosis: 30 y.o. female     Extrauterine Malpositioned IUD   S/P Diagnostic Exploratory Laparoscopy w/ Flex sigmoidoscopy 20   S/P [facility]    Asthma   Anxiety/Depression/Bipolar disordered   Tobacco use    BMI 29/29    Post-operative Diagnosis: Same as above; IUD embedded in omentum     Procedure: Operative Laparoscopic Removal of Extrauterine Malpositioned IUD, Risk Reducing Bilateral Salpingectomy, Pelvic Lavage     Surgeon: Dr. Stan Quinn DO     Assistant(s): Анна Farris DO, PGY-4    Anesthesia: general     Findings:  Normal appearing external genitalia, normal appearing vaginal mucosa, normal appearing cervix, normal appearing uterus, bilateral fallopian tubes with several paratubal cysts, normal appearing bilateral ovaries, IUD embedded in omentum   Total IV fluids/Blood products:  1200 ml crystalloid  Urine Output:  380 ml    Estimated blood loss:  10ml  Drains:  none  Specimens: Left and right fallopian tubes  Instrument and Sponge Count: Correct  Complications:  none  Condition:  stable, transferred to post anesthesia recovery    See full operative report for further details.     Анна Farris DO  Ob/Gyn Resident  Pager: [phone]  11/3/2020, 8:00 AM

## 2020-11-04 LAB — SURGICAL PATHOLOGY REPORT: NORMAL

## 2020-11-13 ENCOUNTER — OFFICE VISIT (OUTPATIENT)
Dept: OBGYN CLINIC | Age: 35
End: 2020-11-13

## 2020-11-13 VITALS — DIASTOLIC BLOOD PRESSURE: 60 MMHG | SYSTOLIC BLOOD PRESSURE: 120 MMHG

## 2020-11-13 PROCEDURE — 99024 POSTOP FOLLOW-UP VISIT: CPT | Performed by: OBSTETRICS & GYNECOLOGY

## 2020-11-13 NOTE — PROGRESS NOTES
Rae MARIAN Maggie  2020  4:21 PM      Rosiebj FONSECA Maggie  Procedure:    Diagnosis Orders   1. Postop check     2. Malpositioned intrauterine device (IUD), sequela     3. S/P laparoscopic surgery           Zac Gil is a 30 y.o. female       The patient was seen, she denies any complaints. She denied any shortness of breath, chest pain or dizziness. She denied any nausea, vomiting, or diarrhea. There is no fever, chills, or rigors. The patient denies any vaginal bleeding, discharge or odor. All of her pre-operative complaints are now resolved. Blood pressure 120/60, last menstrual period 2020, not currently breastfeeding. Abdominal Exam: soft non-tender. Good bowel sounds. No guarding, rebound or rigidity. No costal vertebral angle tenderness bilateral. No hernias    Incision: healing well, no drainage, no erythema, well approximated    Extremities: No edema or calf pain noted bilaterally. Pelvic Exam:Exam deferred. Results for orders placed or performed during the hospital encounter of 10/30/20   Covid-19 Ambulatory   Result Value Ref Range    SARS-CoV-2, DAMON Not Detected Not Detected           Assessment:      Diagnosis Orders   1. Postop check     2. Malpositioned intrauterine device (IUD), sequela     3. S/P laparoscopic surgery          Patient Active Problem List    Diagnosis Date Noted    Malpositioned intrauterine device     Postoperative state 2020    Other specified personal risk factors, not elsewhere classified 10/07/2020     Discussed at length the risks and benefits of concurrent ovarian cancer risk reducing bilateral salpingectomy with patient's upcoming scheduled abdominal or pelvic surgery as this patient is at average risk for development of ovarian cancer. Advised patient that the primary benefit of such surgery is up to a 65% reduction in future risk of ovarian cancer.  Finally, patient has been thoroughly counseled regarding the consequence of the loss of fertility following this procedure. Patient understands that this loss of fertility cannot be reversed and has expressed via verbal and written consent that her wishes are to proceed with this surgery for the purposes of reducing risk for ovarian cancer.  Encounter for prophylactic surgery for risk factor related to malignant neoplasm of ovary 10/07/2020     Risk reduction Counseling for Primary Peritoneal/Ovarian Cancer: The patient was counseled on the risk factors that make her above the average risk for development of primary peritoneal/ovarian cancer. The patient was also counseled on the options of completing Risk Reduction Surgery with her upcoming pelvic gynecologic or abdominal surgery. The procedure with its associated risks, benefits, and alternatives were reviewed at length. Per the the recommendations from the Society of Gynecologic Oncology and the [address] [place] [address] of Obstetricians and Gynecologists, the patient had the procedure reviewed and was informed of the potential benefits of such a procedure. The primary benefit is a greater than 50% reduction in the future risk of development Primary Peritoneal/Ovarian cancer. She was informed that large scale studies have not shown an increase in the estimated blood loss, complications, or operating time. The patient was made aware that bleeding, infection, and injury to nearby organs are potential complications with this additional surgery. The patient was also informed and had this reviewed in detail with her that once the risk reduction procedure is completed she will have lost the fertility opportunity, to conceive naturally, going forward and that any future conception would require reproductive assisted approaches; (IVF, GIFT,ZIFT)-all described in lay terms to the patient. The patient was counseled and understands that the loss of fertility can not be reversed. She voiced understanding of this and signed a written consent.  She was also counseled that any future pregnancy (18-20/1000 cases annually), carries an added increase risk of ectopic pregnancy and the potential need for future surgery. The patient still wishes to proceed with the risk reduction surgery. The patient had explained to her that the completion of the procedure does not guarantee her that she will not be diagnosed with a future primary peritoneal or ovarian malignancy but her risks are greatly reduced.  Bipolar disorder ([facility] [address] 08/25/2020    Anxiety and depression 08/07/2020    Mild persistent asthma without complication 07/60/7367    Tobacco abuse 08/07/2020          POD# 2 weeks   Procedure: Laparoscopic retrieval of abdominal IUD   Stable   Pathology reviewed and found to be benign.  Yes    Plan:   Return if symptoms worsen or fail to improve, for annual.  Restrictions lifted

## 2023-04-25 NOTE — PROGRESS NOTES
SHORT FORM HISTORY AND PHYSICAL      Hospital: [facility]      Tez Santos  1985  Primary Care Physician: Dominique Benavides    2020    HPI: Tez Santos is a 30 y.o. female       Pt had IUD placed . States in  they attempted to do a hysteroscopic removal as strings were not visualized. They told her it was expelled. She had abdominal-pelvic X-ray done by chiropractor which clearly showed the IUD in the right lower quadrant. She follows with chiropractor for chronic low back pain. She was told that it was likely attached to bowel and Dr. Lon Xiong was consulted. He did laparoscopy and noted bowel to be unharmed. The IUD was noted to be located in myometrium. She is having no pelvic pain, no abnormal bleeding, no s/s of infection      Relevant Past History:   Patient Active Problem List    Diagnosis Date Noted    Bipolar disorder ([facility] [address] 2020    Anxiety and depression 2020    Mild persistent asthma without complication     Tobacco abuse 2020         OB History    Para Term  AB Living   2 2 2 0 0 2   SAB TAB Ectopic Molar Multiple Live Births   0 0 0 0 0 2      # Outcome Date GA Lbr [place]/2nd Weight Sex Delivery Anes PTL Lv   2 Term  40w0d   M Vag-Spont  N INDIO   1 Term  40w0d   M Vag-Spont  N INDIO       Past Medical History:   Diagnosis Date    Abnormal Pap smear of cervix     Anxiety     Asthma     Depression     IUD migration, initial encounter ([facility] [address] 2020       Past Surgical History:   Procedure Laterality Date    ABDOMEN SURGERY  2020     LAPAROSCOPY EXPLORATORY . IUD found in uterine wall.     COLONOSCOPY  2020    DIAGNOSITC (N/A ) colonoscopy    LAPAROSCOPY      looked for iud- unable to obtain    LAPAROSCOPY N/A 2020    LAPAROSCOPY EXPLORATORY performed by Louann Dawkins IV, DO at [facility]  2013    cone biopsy     SIGMOIDOSCOPY 9/2/2020    SIGMOIDOSCOPY DIAGNOSTIC FLEXIBLE performed by Franko Dawkins IV, DO at [address] [place] [address] History     Socioeconomic History    Marital status:      Spouse name: Not on file    Number of children: Not on file    Years of education: Not on file    Highest education level: Not on file   Occupational History    Not on file   Social Needs    Financial resource strain: Not on file    Food insecurity     Worry: Not on file     Inability: Not on file    Transportation needs     Medical: Not on file     Non-medical: Not on file   Tobacco Use    Smoking status: Current Every Day Smoker     Packs/day: 1.00     Years: 15.00     Pack years: 15.00     Types: Cigarettes    Smokeless tobacco: Never Used   Substance and Sexual Activity    Alcohol use: Yes     Comment: rarely    Drug use: Not Currently    Sexual activity: Not on file   Lifestyle    Physical activity     Days per week: Not on file     Minutes per session: Not on file    Stress: Not on file   Relationships    Social connections     Talks on phone: Not on file     Gets together: Not on file     Attends Shinto service: Not on file     Active member of club or organization: Not on file     Attends meetings of clubs or organizations: Not on file     Relationship status: Not on file    Intimate partner violence     Fear of current or ex partner: Not on file     Emotionally abused: Not on file     Physically abused: Not on file     Forced sexual activity: Not on file   Other Topics Concern    Not on file   Social History Narrative    Not on file         Psychosocial History: denies    MEDICATIONS:  Current Outpatient Medications   Medication Sig Dispense Refill    ibuprofen (ADVIL;MOTRIN) 600 MG tablet Take 1 tablet by mouth every 6 hours as needed for Pain 360 tablet 1    escitalopram (LEXAPRO) 20 MG tablet Take 20 mg by mouth daily      Budesonide-Formoterol Fumarate (SYMBICORT IN) Inhale into the lungs daily  albuterol sulfate HFA (VENTOLIN HFA) 108 (90 Base) MCG/ACT inhaler Inhale 2 puffs into the lungs every 6 hours as needed for Wheezing      ibuprofen (ADVIL;MOTRIN) 200 MG tablet Take 200 mg by mouth every 6 hours as needed for Pain       No current facility-administered medications for this visit. ALLERGIES:  Allergies as of 09/21/2020 - Review Complete 09/21/2020   Allergen Reaction Noted    Prozac [fluoxetine]  07/21/2020           REVIEW OF SYSTEMS:       General appearance:Appears healthy. Alert; in no acute distress. Pleasant. CARDIOVASCULAR: Denies: chest pain, dyspnea on exertion, palpitations  RESPIRATORY: Denies: cough, shortness of breath, wheezing  GI: Denies: abdominal pain, flank pain, nausea, vomiting, diarrhea  : Denies: dysuria, frequency/urgency, hematuria, pelvic pain . vaginal bleeding; No  MUSCULOSKELETAL: Denies: joint swelling, muscle pain +back pain  SKIN: Denies: rash, hives,   Hematologic: Denies Bleeding Disorder, Coagulopathy or Transfusion    PHYSICAL EXAM:  Blood pressure 110/62, weight 167 lb (75.8 kg), last menstrual period 09/14/2020, not currently breastfeeding. General Appearance:  awake, alert, oriented, in no acute distress, well developed, well nourished, in no acute distress   Skin:  Skin color, texture, turgor normal. No rashes or lesions  Lungs:  Normal expansion. Clear to auscultation. No rales, rhonchi, or wheezing. Heart:  Heart sounds are normal.  Regular rate and rhythm without murmur, gallop or rub. Breast:  Deferred to annual   Abdomen:  Soft, non-tender, normal bowel sounds. No bruits, organomegaly or masses. Extremities: Extremities warm to touch, pink, with no edema. Musculoskeletal:  negative  Peripheral Pulses:  Normal  Neurologic:  Gait normal. Reflexes normal and symmetric. Sensation grossly intact. Female Urogen:  External genitalia, vagina and cervix appear normal and without lesions.   Bimanual exam: no adnexal masses, uterine enlargement or tenderness noted. OMM Structural Component:  The patient did not complain of a Chief complaint requiring OMM. Chief Complaint:none    Structural Exam: No Interest    Diagnostics:  Xr Abdomen (kub) (single Ap View)    Result Date: 9/2/2020  EXAMINATION: SPOT FLUOROSCOPIC IMAGES 9/2/2020 12:28 pm TECHNIQUE: Fluoroscopy was provided by the radiology department for procedure. Radiologist was not present during examination. FLUOROSCOPY DOSE AND TYPE OR TIME AND EXPOSURES: Fluoroscopy time 10.9 seconds. 4 images are submitted. COMPARISON: None HISTORY: Intraprocedural imaging. FINDINGS: 4 spot images of the lower abdomen and pelvis were obtained. AP and lateral views centered over the pelvis are submitted. Laterality is not provided on the AP views. 2 AP views demonstrate cannula projecting over the sacral ala to the left of midline at the L5-S1 level. Lateral views are centered over the proximal sacrum. An IUD is noted in the low pelvis. Intraprocedural fluoroscopic spot images as above. See separate procedure report for more information. Fluoro For Surgical Procedures    Result Date: 9/2/2020  Radiology exam is complete. No Radiologist dictation. Please follow up with ordering provider. Lab Results:  Results for orders placed or performed during the hospital encounter of 08/29/20   Covid-19 Ambulatory   Result Value Ref Range    SARS-CoV-2, DAMON Not Detected Not Detected       Assessment:   Diagnosis Orders   1.  Intrauterine device (IUD) migration, initial encounter [facility])  CT ABDOMEN PELVIS W WO CONTRAST Additional Contrast? None       PAP: 7/2020  Endometrial Sampling: none  Permanent Sterilization Completed: No     Plan:  Robot assisted excision of retained IUD  rrBS  Hysteroscopy  Orders Placed This Encounter   Procedures    CT ABDOMEN PELVIS W WO CONTRAST Additional Contrast? None     Standing Status:   Future     Standing Expiration Date:   9/21/2021     Order Specific Question:   Additional Contrast?     Answer:   None     Order Specific Question:   Reason for exam:     Answer:   IUD migration         Counseling: The patient was counseled on all options both medical and surgical, conservative as well as definitive. She has elected to proceed with the procedure as stated above. The patient was counseled on the procedure. Risks and complications were reviewed in detail. The patients orders, labs, consents have been completed. The history and physical as well as all supporting surgical documentation will be forwarded to the pre-operative holding area. The patient is aware that this procedure may not alleviate her symptoms. That there may be a necessity for a second surgery and that there may be an incomplete removal of abnormal tissue.          ________________________________________D.O./M.D. Date:____________  Hung Vail DO        Patient was seen with total face to face time of 45 minutes. More than 50% of this visit was on counseling and education regarding her    Diagnosis Orders   1. Intrauterine device (IUD) migration, initial encounter [facility])  CT ABDOMEN PELVIS W WO CONTRAST Additional Contrast? None    and her options. She was also counseled on her preventative health maintenance recommendations and follow-up. no

## 2023-12-14 ENCOUNTER — OFFICE VISIT (OUTPATIENT)
Dept: OBGYN CLINIC | Age: 38
End: 2023-12-14
Payer: COMMERCIAL

## 2023-12-14 ENCOUNTER — HOSPITAL ENCOUNTER (OUTPATIENT)
Age: 38
Setting detail: SPECIMEN
Discharge: HOME | End: 2023-12-14

## 2023-12-14 VITALS
HEIGHT: 64 IN | WEIGHT: 139 LBS | SYSTOLIC BLOOD PRESSURE: 122 MMHG | BODY MASS INDEX: 23.73 KG/M2 | DIASTOLIC BLOOD PRESSURE: 84 MMHG

## 2023-12-14 DIAGNOSIS — N93.0 PCB (POST COITAL BLEEDING): Primary | ICD-10-CM

## 2023-12-14 DIAGNOSIS — N93.0 PCB (POST COITAL BLEEDING): ICD-10-CM

## 2023-12-14 DIAGNOSIS — Z12.4 SCREENING FOR CERVICAL CANCER: ICD-10-CM

## 2023-12-14 DIAGNOSIS — N88.8 CERVICAL MASS: ICD-10-CM

## 2023-12-14 LAB
C TRACH DNA SPEC QL PROBE+SIG AMP: NORMAL
CANDIDA SPECIES: NEGATIVE
GARDNERELLA VAGINALIS: POSITIVE
N GONORRHOEA DNA SPEC QL PROBE+SIG AMP: NORMAL
SOURCE: ABNORMAL
SPECIMEN DESCRIPTION: NORMAL
TRICHOMONAS: NEGATIVE

## 2023-12-14 PROCEDURE — 99204 OFFICE O/P NEW MOD 45 MIN: CPT | Performed by: ADVANCED PRACTICE MIDWIFE

## 2023-12-14 RX ORDER — LORAZEPAM 1 MG/1
1 TABLET ORAL EVERY 6 HOURS PRN
COMMUNITY

## 2023-12-14 RX ORDER — CITALOPRAM 40 MG/1
TABLET ORAL
COMMUNITY
Start: 2023-11-20

## 2023-12-14 SDOH — ECONOMIC STABILITY: FOOD INSECURITY: WITHIN THE PAST 12 MONTHS, YOU WORRIED THAT YOUR FOOD WOULD RUN OUT BEFORE YOU GOT MONEY TO BUY MORE.: NEVER TRUE

## 2023-12-14 SDOH — ECONOMIC STABILITY: HOUSING INSECURITY
IN THE LAST 12 MONTHS, WAS THERE A TIME WHEN YOU DID NOT HAVE A STEADY PLACE TO SLEEP OR SLEPT IN A SHELTER (INCLUDING NOW)?: NO

## 2023-12-14 SDOH — ECONOMIC STABILITY: INCOME INSECURITY: HOW HARD IS IT FOR YOU TO PAY FOR THE VERY BASICS LIKE FOOD, HOUSING, MEDICAL CARE, AND HEATING?: NOT HARD AT ALL

## 2023-12-14 SDOH — ECONOMIC STABILITY: FOOD INSECURITY: WITHIN THE PAST 12 MONTHS, THE FOOD YOU BOUGHT JUST DIDN'T LAST AND YOU DIDN'T HAVE MONEY TO GET MORE.: NEVER TRUE

## 2023-12-14 ASSESSMENT — PATIENT HEALTH QUESTIONNAIRE - PHQ9
SUM OF ALL RESPONSES TO PHQ QUESTIONS 1-9: 0
8. MOVING OR SPEAKING SO SLOWLY THAT OTHER PEOPLE COULD HAVE NOTICED. OR THE OPPOSITE, BEING SO FIGETY OR RESTLESS THAT YOU HAVE BEEN MOVING AROUND A LOT MORE THAN USUAL: 0
1. LITTLE INTEREST OR PLEASURE IN DOING THINGS: 0
9. THOUGHTS THAT YOU WOULD BE BETTER OFF DEAD, OR OF HURTING YOURSELF: 0
2. FEELING DOWN, DEPRESSED OR HOPELESS: 0
3. TROUBLE FALLING OR STAYING ASLEEP: 0
SUM OF ALL RESPONSES TO PHQ QUESTIONS 1-9: 0
SUM OF ALL RESPONSES TO PHQ QUESTIONS 1-9: 0
4. FEELING TIRED OR HAVING LITTLE ENERGY: 0
5. POOR APPETITE OR OVEREATING: 0
6. FEELING BAD ABOUT YOURSELF - OR THAT YOU ARE A FAILURE OR HAVE LET YOURSELF OR YOUR FAMILY DOWN: 0
SUM OF ALL RESPONSES TO PHQ QUESTIONS 1-9: 0
SUM OF ALL RESPONSES TO PHQ9 QUESTIONS 1 & 2: 0
7. TROUBLE CONCENTRATING ON THINGS, SUCH AS READING THE NEWSPAPER OR WATCHING TELEVISION: 0
10. IF YOU CHECKED OFF ANY PROBLEMS, HOW DIFFICULT HAVE THESE PROBLEMS MADE IT FOR YOU TO DO YOUR WORK, TAKE CARE OF THINGS AT HOME, OR GET ALONG WITH OTHER PEOPLE: 0

## 2023-12-14 ASSESSMENT — COLUMBIA-SUICIDE SEVERITY RATING SCALE - C-SSRS
4. HAVE YOU HAD THESE THOUGHTS AND HAD SOME INTENTION OF ACTING ON THEM?: NO
3. HAVE YOU BEEN THINKING ABOUT HOW YOU MIGHT KILL YOURSELF?: NO
7. DID THIS OCCUR IN THE LAST THREE MONTHS: NO
5. HAVE YOU STARTED TO WORK OUT OR WORKED OUT THE DETAILS OF HOW TO KILL YOURSELF? DO YOU INTEND TO CARRY OUT THIS PLAN?: NO

## 2023-12-14 NOTE — PROGRESS NOTES
[address] [facility],[address] OB/ [address]  [place] [address]  Dept: [phone]    Patient Name: Naz Tran  Patient Age: 41 y.o. Date of Visit: 2023    Subjective  Chief Complaint   Patient presents with    Vaginal Bleeding     Patient's last menstrual period was 2023. HPI  Chaperone for Intimate Exam  Chaperone was offered as part of the rooming process. Patient declined and agrees to continue with exam without a chaperone. Chaperone: n/a    Postcoital bleeding over a year more than 50% of the time, random. Bleeding for a day to two day. Reports can be spotting or bleeding. No pain. Naz Tran arrives as a New  patient. Gyn Hx:  Abdiel does have monthly menstrual cycles. Cycles lasting 3-5 days. Flow is starting heavy until day 2. Rae Serrano reports she does have cramping with menstrual cycle. Age of menstruation: unsure 'normal' age      Naz Tran is sexually active with 1 male partner(s). Reports is  protecting against a pregnancy. Patient has a hx of a tubal ligation    Rae Serrano denies a history of sexually transmitted infections, none.     Naz Tran does want sexually transmitted infection testing today including gonorrhea, chlamydia, and trichomoniasis      2023     1:07 PM   PHQ Scores   PHQ2 Score 0   PHQ9 Score 0     Interpretation of Total Score Depression Severity: 1-4 = Minimal depression, 5-9 = Mild depression, 10-14 = Moderate depression, 15-19 = Moderately severe depression, 20-27 = Severe depression      OB History          2    Para   2    Term   2            AB        Living   2         SAB        IAB        Ectopic        Molar        Multiple        Live Births   2              Past Medical History:   Diagnosis Date    Abnormal Pap smear of cervix     Anxiety     Asthma     Depression     IUD migration, initial encounter

## 2023-12-15 DIAGNOSIS — B96.89 BV (BACTERIAL VAGINOSIS): Primary | ICD-10-CM

## 2023-12-15 DIAGNOSIS — N76.0 BV (BACTERIAL VAGINOSIS): Primary | ICD-10-CM

## 2023-12-15 RX ORDER — METRONIDAZOLE 500 MG/1
500 TABLET ORAL 2 TIMES DAILY
Qty: 14 TABLET | Refills: 0 | Status: SHIPPED | OUTPATIENT
Start: 2023-12-15 | End: 2023-12-22

## 2023-12-17 LAB
HPV I/H RISK 4 DNA CVX QL NAA+PROBE: NOT DETECTED
HPV SAMPLE: NORMAL
HPV, INTERPRETATION: NORMAL
HPV16 DNA CVX QL NAA+PROBE: NOT DETECTED
HPV18 DNA CVX QL NAA+PROBE: NOT DETECTED
SPECIMEN DESCRIPTION: NORMAL

## 2024-01-15 ENCOUNTER — HOSPITAL ENCOUNTER (OUTPATIENT)
Dept: ULTRASOUND IMAGING | Facility: CLINIC | Age: 39
Discharge: HOME | End: 2024-01-17
Payer: COMMERCIAL

## 2024-01-15 DIAGNOSIS — N88.8 CERVICAL MASS: ICD-10-CM

## 2024-01-15 DIAGNOSIS — N93.0 PCB (POST COITAL BLEEDING): ICD-10-CM

## 2024-01-15 PROCEDURE — 76856 US EXAM PELVIC COMPLETE: CPT

## 2024-01-15 PROCEDURE — 76830 TRANSVAGINAL US NON-OB: CPT

## 2024-01-29 ENCOUNTER — INITIAL CONSULT (OUTPATIENT)
Dept: OBGYN CLINIC | Age: 39
End: 2024-01-29

## 2024-01-29 ENCOUNTER — HOSPITAL ENCOUNTER (OUTPATIENT)
Age: 39
Setting detail: SPECIMEN
Discharge: HOME | End: 2024-01-29

## 2024-01-29 VITALS — BODY MASS INDEX: 25.46 KG/M2 | SYSTOLIC BLOOD PRESSURE: 112 MMHG | DIASTOLIC BLOOD PRESSURE: 62 MMHG | WEIGHT: 146 LBS

## 2024-01-29 DIAGNOSIS — N93.0 PCB (POST COITAL BLEEDING): Primary | ICD-10-CM

## 2024-01-29 DIAGNOSIS — N88.8 CERVICAL MASS: ICD-10-CM

## 2024-01-30 NOTE — PROGRESS NOTES
1/29/2024    Rae Serrano  Patient's last menstrual period was 01/20/2024 (approximate).    Chief Complaint   Patient presents with    Consultation     cervical lesion (red mass protruding from cervical os. Friable- from Erin notes on 12/14  US on 01/15     Blood pressure 112/62, weight 66.2 kg (146 lb), last menstrual period 01/20/2024, not currently breastfeeding.    Chaperone for Intimate Exam  Chaperone was offered and accepted as part of the rooming process.  Chaperone: Sammie Starr    A time out was called by the Chaperone listed above while ALL participants were quiet and attentive.  The procedure to be completed was confirmed, with the appropriate laterality demarcated prior, if appropriate, as well as the patients name and a unique identifier, her date of birth.  All questions were answered to her satisfaction.  The consent was signed prior to the time out and all the risks were discussed in detail.        The patient was counseled on the procedure. Risks, benefits and alternatives were reviewed.  The possibility of Incomplete removal of the abnormal tissue was reviewed.The patient is aware that this is diagnostic and not curative and a second procedure may be needed. A consent was reviewed and obtained.    She has been having abnormal bleeding, post coital bleeding and irritation.  On exam prior she was noted to have a cervical lesion.  Normal pap smear 12/2023.    The patient was positioned comfortably on the exam table. Speculum was placed and cervix was visualized.  There was noted to be area inferior cervix of friability.   The biopsy/biopsies were then obtained from 7 oclock area of uterus. A total of 1 biopsies were obtained and sent to pathology. The sites were controlled with silver nitrate for bleeding. Silver nitrate was used on the friable area of cervix.  The patient tolerated the procedure well. The biopsy sites were hemostatic at the end of the procedure. Post procedure restrictions were

## 2024-01-31 LAB — SURGICAL PATHOLOGY REPORT: NORMAL

## (undated) DEVICE — APPLICATOR MEDICATED 26 CC SOLUTION HI LT ORNG CHLORAPREP

## (undated) DEVICE — 40580 - THE PINK PAD - ADVANCED TRENDELENBURG POSITIONING KIT: Brand: 40580 - THE PINK PAD - ADVANCED TRENDELENBURG POSITIONING KIT

## (undated) DEVICE — TIP COVER ACCESSORY

## (undated) DEVICE — Z DISCONTINUED USE 2751540 TUBING IRRIG L10IN DISP PMP ENDOGATOR

## (undated) DEVICE — GLOVE ORANGE PI 7   MSG9070

## (undated) DEVICE — DRAPE,REIN 53X77,STERILE: Brand: MEDLINE

## (undated) DEVICE — STERILE POLYISOPRENE POWDER-FREE SURGICAL GLOVES WITH EMOLLIENT COATING: Brand: PROTEXIS

## (undated) DEVICE — NEEDLE INSUF L150MM DIA2MM DISP FOR PNEUMOPERI ENDOPATH

## (undated) DEVICE — Device

## (undated) DEVICE — MHPB GYN MINOR PACK: Brand: MEDLINE INDUSTRIES, INC.

## (undated) DEVICE — INSUFFLATION NEEDLE TO ESTABLISH PNEUMOPERITONEUM.: Brand: INSUFFLATION NEEDLE

## (undated) DEVICE — CONTAINER,SPECIMEN,4OZ,OR STRL: Brand: MEDLINE

## (undated) DEVICE — SUTURE VCRL SZ 4-0 L18IN ABSRB UD L19MM PS-2 3/8 CIR PRIM J496H

## (undated) DEVICE — 4-PORT MANIFOLD: Brand: NEPTUNE 2

## (undated) DEVICE — TOWEL,OR,DSP,ST,NATURAL,DLX,4/PK,20PK/CS: Brand: MEDLINE

## (undated) DEVICE — SUTURE MCRYL + SZ 4 0 L18IN ABSRB UD PC 3 L16MM 3 8 CIR PRIM MCP845G

## (undated) DEVICE — SOLUTION IV IRRIG POUR BRL 0.9% SODIUM CHL 2F7124

## (undated) DEVICE — AIRSEAL 8 MM ACCESS PORT AND LOW PROFILE OBTURATOR WITH BLADELESS OPTICAL TIP, 120 MM LENGTH: Brand: AIRSEAL

## (undated) DEVICE — SOLUTION ANTIFOG VIS SYS CLEARIFY LAPSCP

## (undated) DEVICE — SYRINGE, LUER SLIP, STERILE, 10ML: Brand: MEDLINE

## (undated) DEVICE — CANNULA SEAL

## (undated) DEVICE — GOWN,SIRUS,POLYRNF,BRTHSLV,XL,30/CS: Brand: MEDLINE

## (undated) DEVICE — SOLUTION SCRB 4OZ 10% POVIDONE IOD ANTIMIC BTL

## (undated) DEVICE — COVER,C-ARM,41X74: Brand: MEDLINE

## (undated) DEVICE — ADHESIVE SKIN CLSR 0.7ML TOP DERMBND ADV

## (undated) DEVICE — SEALER ENDOSCP L37CM NANO COAT BLNT TIP LAP DIV

## (undated) DEVICE — BLADELESS OBTURATOR: Brand: WECK VISTA

## (undated) DEVICE — SET ENDOSCP SEAL HYSTEROSCOPE RIG OUTFLO CHN DISP MYOSURE

## (undated) DEVICE — SYRINGE MED 10ML LUERLOCK TIP W/O SFTY DISP

## (undated) DEVICE — TTL1LYR 16FR10ML 100%SIL TMPST TR: Brand: MEDLINE

## (undated) DEVICE — TRI-LUMEN FILTERED TUBE SET WITH ACTIVATED CHARCOAL FILTER: Brand: AIRSEAL

## (undated) DEVICE — ELECTRODE PT RET AD L9FT HI MOIST COND ADH HYDRGEL CORDED

## (undated) DEVICE — BLANKET WRM W29.9XL79.1IN UP BODY FORC AIR MISTRAL-AIR

## (undated) DEVICE — SHEET,DRAPE,40X58,STERILE: Brand: MEDLINE

## (undated) DEVICE — SUTURE VCRL SZ 2-0 L27IN ABSRB UD L26MM CT-2 1/2 CIR J269H

## (undated) DEVICE — TROCAR: Brand: KII FIOS FIRST ENTRY

## (undated) DEVICE — TUBING, SUCTION, 3/16" X 10', STRAIGHT: Brand: MEDLINE

## (undated) DEVICE — SOLUTION IV IRRIG WATER 1000ML POUR BRL 2F7114

## (undated) DEVICE — STRAP,POSITIONING,KNEE/BODY,FOAM,4X60": Brand: MEDLINE

## (undated) DEVICE — PACK LAP BASIC

## (undated) DEVICE — TOTAL TRAY, DB, 100% SILI FOLEY, 16FR 10: Brand: MEDLINE

## (undated) DEVICE — TROCAR: Brand: KII® SLEEVE

## (undated) DEVICE — ARM DRAPE

## (undated) DEVICE — LEGGINGS, PAIR, 33X51 XL, STERILE: Brand: MEDLINE

## (undated) DEVICE — SOLUTION IV 1000ML 0.9% SOD CHL PH 5 INJ USP VIAFLX PLAS

## (undated) DEVICE — INSUFFLATION TUBING SET WITH FILTER, FUNNEL CONNECTOR AND LUER LOCK: Brand: JOSNOE MEDICAL INC

## (undated) DEVICE — DRAPE,LAP,CHOLE,W/TROUGHS,STERILE: Brand: MEDLINE

## (undated) DEVICE — Z DISCONTINUED USE 2277125 SYRINGE NEEDLELESS 10 CC LUER LCK SLIP STRL LTX FREE

## (undated) DEVICE — COUNTER NDL 40 COUNT HLD 70 FOAM BLK ADH W/ MAG

## (undated) DEVICE — SUTURE VCRL SZ 0 L27IN ABSRB UD L36MM CT-1 1/2 CIR J260H

## (undated) DEVICE — Z INACTIVE USE 2527070 DRAPE SURG W40XL44IN UNDERBUTTOCK SMS POLYPR W/ PCH BK DISP

## (undated) DEVICE — GLOVE ORANGE PI 7 1/2   MSG9075

## (undated) DEVICE — PREP SOL PVP IODINE 4%  4 OZ/BTL

## (undated) DEVICE — TUBING INSUFFLATION CAP W/ EXT CARBON DIOX ENDO SMARTCAP

## (undated) DEVICE — COVER,TABLE,HEAVY DUTY,50"X90",STRL: Brand: MEDLINE

## (undated) DEVICE — JELLY,LUBE,STERILE,FLIP TOP,TUBE,2-OZ: Brand: MEDLINE

## (undated) DEVICE — PENCIL ES L3M BTTN SWCH HOLSTER W/ BLDE ELECTRD EDGE

## (undated) DEVICE — PROTECTOR ULN NRV PUR FOAM HK LOOP STRP ANATOMICALLY

## (undated) DEVICE — Device: Brand: DEFENDO VALVE AND CONNECTOR KIT

## (undated) DEVICE — GOWN,AURORA,NONREINFORCED,LARGE: Brand: MEDLINE

## (undated) DEVICE — BRUSH PRESOAK CLEAN BACTERIOSTATIC DUAL

## (undated) DEVICE — DISSECTOR LAP DIA5MM BLNT TIP ENDOPATH